# Patient Record
Sex: MALE | Race: WHITE | Employment: OTHER | URBAN - METROPOLITAN AREA
[De-identification: names, ages, dates, MRNs, and addresses within clinical notes are randomized per-mention and may not be internally consistent; named-entity substitution may affect disease eponyms.]

---

## 2017-04-26 ENCOUNTER — OFFICE VISIT (OUTPATIENT)
Dept: NURSING | Facility: CLINIC | Age: 82
End: 2017-04-26
Payer: MEDICARE

## 2017-04-26 DIAGNOSIS — J84.112 IPF (IDIOPATHIC PULMONARY FIBROSIS) (H): ICD-10-CM

## 2017-04-26 PROCEDURE — 94375 RESPIRATORY FLOW VOLUME LOOP: CPT | Performed by: INTERNAL MEDICINE

## 2017-04-26 PROCEDURE — 94729 DIFFUSING CAPACITY: CPT | Performed by: INTERNAL MEDICINE

## 2017-04-26 NOTE — MR AVS SNAPSHOT
After Visit Summary   4/26/2017    Des Saravia    MRN: [id number]           Patient Information     Date Of Birth          1935        Visit Information        Provider Department      4/26/2017 3:30 PM PFT LAB [facility]        Today's Diagnoses     IPF (idiopathic pulmonary fibrosis) (H)           Follow-ups after your visit        Your next 10 appointments already scheduled     Apr 27, 2017  9:00 AM CDT   Return Visit with David Morris Perlman, MD   [facility] ([facility])    [address] [place] [id number] [phone]              Who to contact     If you have questions or need follow up information about today's clinic visit or your schedule please contact [facility] directly at [phone].  Normal or non-critical lab and imaging results will be communicated to you by MyChart, letter or phone within 4 business days after the clinic has received the results. If you do not hear from us within 7 days, please contact the clinic through MyChart or phone. If you have a critical or abnormal lab result, we will notify you by phone as soon as possible.  Submit refill requests through [employer] or call your pharmacy and they will forward the refill request to us. Please allow 3 business days for your refill to be completed.          Additional Information About Your Visit        [email or web address] Information     [employer] is an electronic gateway that provides easy, online access to your medical records. With [employer], you can request a clinic appointment, read your test results, renew a prescription or communicate with your care team.     To sign up for [employer] visit the website at [email or web address]   You will be asked to enter the access code listed below, as well as some personal information. Please follow the directions to create your username and password.     Your access code is: [email or web address]  Expires:  2017  4:23 PM     Your access code will  in 90 days. If you need help or a new code, please contact your [facility] or call [phone] for assistance.        Care EveryWhere ID     This is your Care EveryWhere ID. This could be used by other organizations to access your [place] medical records  [id number]         Blood Pressure from Last 3 Encounters:   16 125/74   16 134/75   16 123/74    Weight from Last 3 Encounters:   16 79.7 kg (175 lb 9.6 oz)   16 81.9 kg (180 lb 8 oz)   16 82.7 kg (182 lb 6.4 oz)              We Performed the Following     General PFT Lab (Please always keep checked)     HC DIFFUSING CAPACITY     HC PLETHYSMOGRAPHY LUNG VOLUMES W/WO AIRWAY RESIST     Pulmonary Function Test     RESPIRATORY FLOW VOLUME LOOP     RESPIRATORY FLOW VOLUME LOOP        Primary Care Provider Office Phone # Fax #    Omkar Mccarthy [phone]       [address] [address]        Thank you!     Thank you for choosing [facility]  for your care. Our goal is always to provide you with excellent care. Hearing back from our patients is one way we can continue to improve our services. Please take a few minutes to complete the written survey that you may receive in the mail after your visit with us. Thank you!             Your Updated Medication List - Protect others around you: Learn how to safely use, store and throw away your medicines at www.disposemymeds.org.          This list is accurate as of: 17  4:23 PM.  Always use your most recent med list.                   Brand Name Dispense Instructions for use    APAP 325 MG Tabs      Take 975 mg by mouth every 4 hours as needed for mild pain       aspirin 81 MG tablet      Take 1 tablet by mouth daily.       guaiFENesin-codeine 100-10 MG/5ML Soln solution    ROBITUSSIN AC     Take 1-2 tsp. by mouth every 4 hours as needed for cough        LIPITOR 40 MG tablet   Generic drug:  atorvastatin      Take 1 tablet by mouth daily.       metoprolol 25 MG 24 hr tablet    TOPROL-XL         Multiple vitamin Tabs      Take 1 tablet by mouth daily.       PANTOPRAZOLE SODIUM PO      Take 40 mg by mouth 2 times daily (before meals)       tamsulosin 0.4 MG capsule    FLOMAX    30 capsule    Take 1 capsule (0.4 mg) by mouth daily       ZITHROMAX Z-TREVON PO

## 2017-04-27 ENCOUNTER — OFFICE VISIT (OUTPATIENT)
Dept: PULMONOLOGY | Facility: CLINIC | Age: 82
End: 2017-04-27
Payer: MEDICARE

## 2017-04-27 VITALS
WEIGHT: 174.2 LBS | HEART RATE: 85 BPM | BODY MASS INDEX: 24.39 KG/M2 | DIASTOLIC BLOOD PRESSURE: 81 MMHG | OXYGEN SATURATION: 96 % | SYSTOLIC BLOOD PRESSURE: 140 MMHG | HEIGHT: 71 IN

## 2017-04-27 DIAGNOSIS — J84.112 IPF (IDIOPATHIC PULMONARY FIBROSIS) (H): Primary | ICD-10-CM

## 2017-04-27 PROCEDURE — 99214 OFFICE O/P EST MOD 30 MIN: CPT | Performed by: INTERNAL MEDICINE

## 2017-04-27 NOTE — PROGRESS NOTES
"Reason for Visit  Des Saravia is a 82 year old year old male who is being seen for RECHECK    ILD HPI    See Dictated Note      Current Outpatient Prescriptions   Medication     metoprolol (TOPROL-XL) 25 MG 24 hr tablet     tamsulosin (FLOMAX) 0.4 MG 24 hr capsule     acetaminophen 325 MG TABS     PANTOPRAZOLE SODIUM PO     aspirin 81 MG tablet     atorvastatin (LIPITOR) 40 MG tablet     Multiple Vitamin TABS     No current facility-administered medications for this visit.      Allergies   Allergen Reactions     Lisinopril Cough     Past Medical History:   Diagnosis Date     CAD (coronary artery disease)     CABG in 1998     GERD (gastroesophageal reflux disease)      HTN (hypertension)      Hyperlipidaemia      IPF (idiopathic pulmonary fibrosis) (H)     diagnosed in 2008 by VATS lung biopsy; completed [id number] study in 2011 - received drug.  In IPF biomarkers study since 2013.  Pirfenidone started 1/2015, stopped 4/2015 due to rash.       Past Surgical History:   Procedure Laterality Date     CHOLECYSTECTOMY  2/2014     CORONARY ARTERY BYPASS  1998    4 vessel     THORACOSCOPIC BIOPSY LUNG  2009       Social History     Social History     Marital status:      Spouse name: N/A     Number of children: N/A     Years of education: N/A     Occupational History     Not on file.     Social History Main Topics     Smoking status: Never Smoker     Smokeless tobacco: Never Used     Alcohol use No     Drug use: Not on file     Sexual activity: Not on file     Other Topics Concern     Not on file     Social History Narrative       No family history on file.    ROS Pulmonary    A complete ROS was otherwise negative except as noted in the HPI.  Vitals:    04/27/17 0901 04/27/17 0902   BP: 140/81    BP Location: Left arm    Patient Position: Chair    Cuff Size: Adult Regular    Pulse: 85    SpO2: (!) 88% 96%   Weight: 79 kg (174 lb 3.2 oz)    Height: 1.803 m (5' 11\")      Exam:   GENERAL APPEARANCE: Well developed, " well nourished, alert, and in no apparent distress.  NECK: supple, no masses, no thyromegaly.  LYMPHATICS: No significant axillary, cervical, or supraclavicular nodes.  RESP: Bibasilar crackles  CV: Normal S1, S2, regular rhythm, normal rate, no rub, no murmur,  no gallop, no LE edema.   ABDOMEN:  Bowel sounds normal, soft, nontender, no HSM or masses.   MS: extremities normal- no clubbing, no cyanosis.  SKIN: no rash on limited exam  PSYCH: mentation appears normal. and affect normal/bright  Results: I have reviewed all imaging, PFTs and other relavent tests, please see below for details, PFT and imaging results were reviewed with the patient.  See Dictated Note  Assessment and plan:  See Dictated Note    CBC   Recent Labs   Lab Test  01/17/16   0011  01/14/16   0526   RBC  4.67  4.67   HGB  14.2  14.3   HCT  42.0  42.4   PLT  247  219       Basic Metabolic Panel  Recent Labs   Lab Test  01/16/16   1111  01/15/16   0635  01/14/16   0526   NA   --   140  140   POTASSIUM  4.1  3.7  3.4   CHLORIDE   --   104  104   CO2   --   27  28   BUN   --   18  21   GLC   --   99  102*   TEE   --   8.3*  8.2*       INR  Recent Labs   Lab Test  01/17/16   0011  11/01/15   0743   INR  1.19*  1.14       PFT  PFT Latest Ref Rng & Units 4/26/2017   FVC L 1.97   FEV1 L 1.64   FVC% % 50   FEV1% % 57           CC:

## 2017-04-27 NOTE — PROGRESS NOTES
HISTORY OF PRESENT ILLNESS:  Mr. Des Saravia is an 82-year-old male with interstitial lung disease, likely secondary to idiopathic pulmonary fibrosis, although he has had the diagnosis for approximately 10 years.  In general the patient has done well.  He has been stable for quite some time; however, in 08/2016 he did have a drop in his PFTs.  We discussed antifibrotic therapy, he was initiated on pirfenidone; however, did not tolerate this due to rash.  He stopped this and otherwise has done okay.  He continues to use his oxygen at home, typically 3-4 liters with exertion.  2 at rest.  He uses at night.  In talking with the patient and his wife he does feel that maybe his exercise tolerance has decreased a little bit.  She feels that he is more short of breath than he has been.  In general though the patient states that he feels pretty good.  He is able to do all of the activities that he wants to do, he has been able to travel and generally states that overall he feels well.  He has no other new complaints today.      PULMONARY FUNCTION TESTS:  Moderate restriction with severe reduction in diffusing capacity.  The FVC is stable from previous, although still down from earlier in 2016.  The diffusing capacity is down from previous; however, generally in the range where it has been over the past few years.      ASSESSMENT AND PLAN:  An 82-year-old male with interstitial lung disease, likely idiopathic pulmonary fibrosis.  I did have a discussion with the patient about antifibrotic therapy again this time with the patient to see if he was interested.  We discussed the pros and cons and basically that this may help prevent further progression of his disease or slow further progression of his disease, but does possibly have some side effects.  After a long discussion with the patient and his wife he has elected not to initiate at this time, he feels that he generally feels good and is worried about taking a medication  that is very expensive and also that would give him more side effects and I think ultimately this is a reasonable decision for him.  Therefore, we will just continue his supplemental oxygen.  I will continue to see him every 4 months with pulmonary function tests.  I did inform him that if he decides to change his mind and wants to initiate antifibrotic therapy we can initiate that at any time.  I will see the patient back in 4 months with pulmonary function tests.  He will call sooner as needed.         DAVID M. PERLMAN, MD             D: 2017 10:21   T: 2017 14:06   MT: LUCILA#[id number]      Name:     ERIN YOO   MRN:      [id number]        Account:      [id number]   :      1935           Visit Date:   2017      Document: [id number]

## 2017-04-27 NOTE — NURSING NOTE
"Des Saravia's goals for this visit include: IPD  He requests these members of his care team be copied on today's visit information: yes    PCP: Omkar Mccarthy    Referring Provider:  No referring provider defined for this encounter.    Chief Complaint   Patient presents with     RECHECK       Initial /81 (BP Location: Left arm, Patient Position: Chair, Cuff Size: Adult Regular)  Pulse 85  Ht 1.803 m (5' 11\")  Wt 79 kg (174 lb 3.2 oz)  SpO2 96%  BMI 24.3 kg/m2 Estimated body mass index is 24.3 kg/(m^2) as calculated from the following:    Height as of this encounter: 1.803 m (5' 11\").    Weight as of this encounter: 79 kg (174 lb 3.2 oz).  Medication Reconciliation: complete    Do you need any medication refills at today's visit? no    "

## 2017-04-27 NOTE — MR AVS SNAPSHOT
After Visit Summary   4/27/2017    Des Saravia    MRN: [id number]           Patient Information     Date Of Birth          1935        Visit Information        Provider Department      4/27/2017 9:00 AM Perlman, David Morris, MD [facility]        Today's Diagnoses     IPF (idiopathic pulmonary fibrosis) (H)    -  1       Follow-ups after your visit        Your next 10 appointments already scheduled     Sep 28, 2017  9:00 AM CDT   Office Visit with PFT LAB   [facility] ([facility])    [address] [place] [id number] [phone]           Bring a current list of meds and any records pertaining to this visit.  For Physicals, please bring immunization records and any forms needing to be filled out.  Please arrive 10 minutes early to complete paperwork.            Sep 28, 2017 10:30 AM CDT   Return Visit with David Morris Perlman, MD   [facility] ([facility])    [address] [place] [id number] [phone]              Future tests that were ordered for you today     Open Future Orders        Priority Expected Expires Ordered    General PFT Lab (Please always keep checked) Routine  4/27/2018 4/27/2017    Pulmonary Function Test Routine  4/27/2018 4/27/2017    6 minute walk test Routine  4/27/2018 4/27/2017            Who to contact     If you have questions or need follow up information about today's clinic visit or your schedule please contact [facility] directly at [phone].  Normal or non-critical lab and imaging results will be communicated to you by MyChart, letter or phone within 4 business days after the clinic has received the results. If you do not hear from us within 7 days, please contact the clinic through MyChart or phone. If you have a critical or abnormal lab result, we will notify you by phone as soon as possible.  Submit refill requests  "through [employer] or call your pharmacy and they will forward the refill request to us. Please allow 3 business days for your refill to be completed.          Additional Information About Your Visit        Care EveryWhere ID     This is your Care EveryWhere ID. This could be used by other organizations to access your [place] medical records  [id number]        Your Vitals Were     Pulse Height Pulse Oximetry BMI (Body Mass Index)          85 1.803 m (5' 11\") 96% 24.3 kg/m2         Blood Pressure from Last 3 Encounters:   04/27/17 140/81   11/03/16 125/74   08/04/16 134/75    Weight from Last 3 Encounters:   04/27/17 79 kg (174 lb 3.2 oz)   11/03/16 79.7 kg (175 lb 9.6 oz)   08/04/16 81.9 kg (180 lb 8 oz)               Primary Care Provider Office Phone # Fax #    Omkar Mccarthy [phone]       [address] [address]        Thank you!     Thank you for choosing [facility]  for your care. Our goal is always to provide you with excellent care. Hearing back from our patients is one way we can continue to improve our services. Please take a few minutes to complete the written survey that you may receive in the mail after your visit with us. Thank you!             Your Updated Medication List - Protect others around you: Learn how to safely use, store and throw away your medicines at www.disposemymeds.org.          This list is accurate as of: 4/27/17  9:47 AM.  Always use your most recent med list.                   Brand Name Dispense Instructions for use    APAP 325 MG Tabs      Take 975 mg by mouth every 4 hours as needed for mild pain       aspirin 81 MG tablet      Take 1 tablet by mouth daily.       LIPITOR 40 MG tablet   Generic drug:  atorvastatin      Take 1 tablet by mouth daily.       metoprolol 25 MG 24 hr tablet    TOPROL-XL         Multiple vitamin Tabs      Take 1 tablet by mouth daily.       PANTOPRAZOLE SODIUM PO      Take 40 mg by mouth 2 times " daily (before meals)       tamsulosin 0.4 MG capsule    FLOMAX    30 capsule    Take 1 capsule (0.4 mg) by mouth daily

## 2017-06-22 LAB
DLCOUNC-%PRED-PRE: 37 %
DLCOUNC-PRE: 8.94 ML/MIN/MMHG
DLCOUNC-PRED: 23.57 ML/MIN/MMHG
ERV-%PRED-PRE: 55 %
ERV-PRE: 0.6 L
ERV-PRED: 1.08 L
EXPTIME-PRE: 5.4 SEC
FEF2575-%PRED-PRE: 85 %
FEF2575-PRE: 1.7 L/SEC
FEF2575-PRED: 1.98 L/SEC
FEFMAX-%PRED-PRE: 107 %
FEFMAX-PRE: 7.6 L/SEC
FEFMAX-PRED: 7.07 L/SEC
FEV1-%PRED-PRE: 57 %
FEV1-PRE: 1.64 L
FEV1FEV6-PRE: 83 %
FEV1FEV6-PRED: 76 %
FEV1FVC-PRE: 83 %
FEV1FVC-PRED: 74 %
FEV1SVC-PRE: 86 %
FEV1SVC-PRED: 63 %
FIFMAX-PRE: 4.73 L/SEC
FVC-%PRED-PRE: 50 %
FVC-PRE: 1.97 L
FVC-PRED: 3.89 L
IC-%PRED-PRE: 37 %
IC-PRE: 1.3 L
IC-PRED: 3.49 L
VA-%PRED-PRE: 43 %
VA-PRE: 3.01 L
VC-%PRED-PRE: 41 %
VC-PRE: 1.9 L
VC-PRED: 4.57 L

## 2017-09-07 ENCOUNTER — OFFICE VISIT (OUTPATIENT)
Dept: NURSING | Facility: CLINIC | Age: 82
End: 2017-09-07
Payer: MEDICARE

## 2017-09-07 ENCOUNTER — OFFICE VISIT (OUTPATIENT)
Dept: PULMONOLOGY | Facility: CLINIC | Age: 82
End: 2017-09-07
Payer: MEDICARE

## 2017-09-07 VITALS
SYSTOLIC BLOOD PRESSURE: 132 MMHG | BODY MASS INDEX: 23.99 KG/M2 | DIASTOLIC BLOOD PRESSURE: 78 MMHG | OXYGEN SATURATION: 92 % | WEIGHT: 177.1 LBS | HEART RATE: 85 BPM | HEIGHT: 72 IN

## 2017-09-07 DIAGNOSIS — J84.9 ILD (INTERSTITIAL LUNG DISEASE) (H): ICD-10-CM

## 2017-09-07 DIAGNOSIS — J84.112 IPF (IDIOPATHIC PULMONARY FIBROSIS) (H): ICD-10-CM

## 2017-09-07 DIAGNOSIS — J84.9 ILD (INTERSTITIAL LUNG DISEASE) (H): Primary | ICD-10-CM

## 2017-09-07 LAB
6 MIN WALK (FT): NORMAL FT
6 MIN WALK (FT): NORMAL FT
6 MIN WALK (M): NORMAL M
6 MIN WALK (M): NORMAL M

## 2017-09-07 PROCEDURE — 94375 RESPIRATORY FLOW VOLUME LOOP: CPT | Performed by: INTERNAL MEDICINE

## 2017-09-07 PROCEDURE — 99213 OFFICE O/P EST LOW 20 MIN: CPT | Mod: 25 | Performed by: INTERNAL MEDICINE

## 2017-09-07 PROCEDURE — 94726 PLETHYSMOGRAPHY LUNG VOLUMES: CPT | Performed by: INTERNAL MEDICINE

## 2017-09-07 PROCEDURE — 94620 HC PULMONARY STRESS TEST, SIMPLE: CPT | Performed by: INTERNAL MEDICINE

## 2017-09-07 PROCEDURE — 94729 DIFFUSING CAPACITY: CPT | Performed by: INTERNAL MEDICINE

## 2017-09-07 NOTE — MR AVS SNAPSHOT
After Visit Summary   9/7/2017    Des Saravia    MRN: [id number]           Patient Information     Date Of Birth          1935        Visit Information        Provider Department      9/7/2017 8:45 AM PFT LAB [facility]        Today's Diagnoses     IPF (idiopathic pulmonary fibrosis) (H)        ILD (interstitial lung disease) (H)           Follow-ups after your visit        Your next 10 appointments already scheduled     Sep 28, 2017  9:00 AM CDT   Office Visit with PFT LAB   [facility]    [id number] [place] [id number] [phone]           Bring a current list of meds and any records pertaining to this visit. For Physicals, please bring immunization records and any forms needing to be filled out. Please arrive 10 minutes early to complete paperwork.            Jan 04, 2018  9:00 AM CST   SHORT with PFT LAB   [facility]    [id number] [place] [phone]            Jan 04, 2018  9:30 AM CST   Return Visit with David Morris Perlman, MD   [facility]    [address] [place] [phone]              Future tests that were ordered for you today     Open Future Orders        Priority Expected Expires Ordered    6 minute walk test Routine 9/7/2017 9/7/2018 9/7/2017    General PFT Lab (Please always keep checked) Routine  9/7/2018 9/7/2017    Pulmonary Function Test Routine  9/7/2018 9/7/2017            Who to contact     If you have questions or need follow up information about today's clinic visit or your schedule please contact [facility] directly at [phone].  Normal or non-critical lab and imaging results will be communicated to you by MyChart, letter or phone within 4 business days after the clinic has received the results. If you do  not hear from us within 7 days, please contact the clinic through [facility] or phone. If you have a critical or abnormal lab result, we will notify you by phone as soon as possible.  Submit refill requests through [facility] or call your pharmacy and they will forward the refill request to us. Please allow 3 business days for your refill to be completed.          Additional Information About Your Visit        [facility] Information     [facility] is an electronic gateway that provides easy, online access to your medical records. With [facility], you can request a clinic appointment, read your test results, renew a prescription or communicate with your care team.     To sign up for [facility] visit the website at [email or web address]   You will be asked to enter the access code listed below, as well as some personal information. Please follow the directions to create your username and password.     Your access code is: [email or web address]  Expires: 2017 10:55 AM     Your access code will  in 90 days. If you need help or a new code, please contact your [facility] or call [phone] for assistance.        Care EveryWhere ID     This is your Care EveryWhere ID. This could be used by other organizations to access your [place] medical records  [id number]         Blood Pressure from Last 3 Encounters:   17 132/78   17 140/81   16 125/74    Weight from Last 3 Encounters:   17 80.3 kg (177 lb 1.6 oz)   17 79 kg (174 lb 3.2 oz)   16 79.7 kg (175 lb 9.6 oz)              We Performed the Following     6 minute walk test     6 minute walk test     General PFT Lab (Please always keep checked)     HC DIFFUSING CAPACITY     Pulmonary Function Test        Primary Care Provider Office Phone # Fax #    Omkar Mccarthy [phone]       [address] [address]        Equal Access to Services     NANCIE ARRIETA AH: Keke cheek  Dom, waottoda luqadaha, qaybta kaalmada santana, kirk strong chelsiebhavani hendersonkulwant yevgeniy. So [facility] [phone].    ATENCIÓN: Si navjot singletary, tiene a painter disposición servicios gratuitos de asistencia lingüística. Gokul al [phone].    We comply with applicable federal civil rights laws and [place] laws. We do not discriminate on the basis of race, color, national origin, age, disability sex, sexual orientation or gender identity.            Thank you!     Thank you for choosing [facility]  for your care. Our goal is always to provide you with excellent care. Hearing back from our patients is one way we can continue to improve our services. Please take a few minutes to complete the written survey that you may receive in the mail after your visit with us. Thank you!             Your Updated Medication List - Protect others around you: Learn how to safely use, store and throw away your medicines at www.disposemymeds.org.          This list is accurate as of: 9/7/17 11:07 AM.  Always use your most recent med list.                   Brand Name Dispense Instructions for use Diagnosis    APAP 325 MG Tabs      Take 975 mg by mouth every 4 hours as needed for mild pain    Acute post-operative pain       aspirin 81 MG tablet      Take 1 tablet by mouth daily.    IPF (idiopathic pulmonary fibrosis) (H)       LIPITOR 40 MG tablet   Generic drug:  atorvastatin      Take 1 tablet by mouth daily.    IPF (idiopathic pulmonary fibrosis) (H)       metoprolol 25 MG 24 hr tablet    TOPROL-XL          Multiple vitamin Tabs      Take 1 tablet by mouth daily.    IPF (idiopathic pulmonary fibrosis) (H)       PANTOPRAZOLE SODIUM PO      Take 40 mg by mouth 2 times daily (before meals)    IPF (idiopathic pulmonary fibrosis) (H)       tamsulosin 0.4 MG capsule    FLOMAX    30 capsule    Take 1 capsule (0.4 mg) by mouth daily    Urinary retention

## 2017-09-07 NOTE — PROGRESS NOTES
PFT Note:  Completed FVC, SVC, DLCO and Pleth per Dr. Perlman.    6 MWT:  Completed walk on RA for qualification of O2, 4 LPM Pulse Dose and 4 LPM continuous. Reports sent to STAT scan and copies to Dr. Perlman.

## 2017-09-07 NOTE — PROGRESS NOTES
Reason for Visit  Des Saravia is a 82 year old year old male who is being seen for Interstitial Lung Disease (ILD)    ILD HPI    Des Saravia is an 82-year-old male with a history of interstitial lung disease likely secondary to peptic pulmonary fibrosis although he's had the diagnosis for about 10 years. In 2016 he had issues with recurrent right-sided pneumothorax ultimately requiring talc pleurodesis. He is seen today for follow-up overall states that generally he feels well he does feel that maybe he is a little more short of breath with exertion than he was at the last visit. We had discussed and a fibrotic therapy in the past he did briefly try pirfenidone although he could not tolerate this. He's been reluctant to try any other anti-fibrotic medication since then. Overall he generally states though that he feels well is able to do his activities of daily living and travel by car throughout the region to see his grandchildren. Use oxygen to 3 L with exertion. No other new complaints today.      Current Outpatient Prescriptions   Medication     metoprolol (TOPROL-XL) 25 MG 24 hr tablet     tamsulosin (FLOMAX) 0.4 MG 24 hr capsule     acetaminophen 325 MG TABS     PANTOPRAZOLE SODIUM PO     aspirin 81 MG tablet     atorvastatin (LIPITOR) 40 MG tablet     Multiple Vitamin TABS     No current facility-administered medications for this visit.      Allergies   Allergen Reactions     Lisinopril Cough     Past Medical History:   Diagnosis Date     CAD (coronary artery disease)     CABG in 1998     GERD (gastroesophageal reflux disease)      HTN (hypertension)      Hyperlipidaemia      IPF (idiopathic pulmonary fibrosis) (H)     diagnosed in 2008 by VATS lung biopsy; completed [id number] study in 2011 - received drug.  In IPF biomarkers study since 2013.  Pirfenidone started 1/2015, stopped 4/2015 due to rash.       Past Surgical History:   Procedure Laterality Date     CHOLECYSTECTOMY  2/2014     CORONARY ARTERY  "BYPASS  1998    4 vessel     THORACOSCOPIC BIOPSY LUNG  2009       Social History     Social History     Marital status:      Spouse name: N/A     Number of children: N/A     Years of education: N/A     Occupational History     Not on file.     Social History Main Topics     Smoking status: Never Smoker     Smokeless tobacco: Never Used     Alcohol use No     Drug use: Not on file     Sexual activity: Not on file     Other Topics Concern     Not on file     Social History Narrative       No family history on file.    ROS Pulmonary    A complete ROS was otherwise negative except as noted in the HPI.  Vitals:    09/07/17 0956   BP: 132/78   BP Location: Left arm   Patient Position: Chair   Cuff Size: Adult Regular   Pulse: 85   SpO2: 92%   Weight: 80.3 kg (177 lb 1.6 oz)   Height: 1.816 m (5' 11.5\")     Exam:   GENERAL APPEARANCE: Well developed, well nourished, alert, and in no apparent distress.  NECK: supple, no masses, no thyromegaly.  LYMPHATICS: No significant axillary, cervical, or supraclavicular nodes.  RESP: good air flow throughout, - no crackles, rhonchi or wheezes.  CV: Normal S1, S2, regular rhythm, normal rate, no rub, no murmur,  no gallop, no LE edema.   ABDOMEN:  Bowel sounds normal, soft, nontender, no HSM or masses.   MS: extremities normal- no clubbing, no cyanosis.  SKIN: no rash on limited exam  NEURO: Mentation intact, speech normal, normal strength and tone, normal gait and stance  PSYCH: mentation appears normal. and affect normal/bright  Results: I have reviewed all imaging, PFTs and other relavent tests, please see below for details, PFT and imaging results were reviewed with the patient.  PFTs: Moderate restriction with severe reduction in DLCO, stable from previous.  Assessment and plan:     82-year-old male with idiopathic pulmonary fibrosis stable point function tests but perhaps some slight progression of disease based on his symptoms. Again discussed anti-fibrotic therapy and " the patient generally feels well and is not interested in starting a new medication that may give him significant side effects. I think is reasonable decision for him. We'll continue with the supplemental oxygen I will plan to see him back in 4 months with Pulmicort and as he will call sooner with any changes in his breathing.    CBC   Recent Labs   Lab Test  01/17/16   0011  01/14/16   0526   RBC  4.67  4.67   HGB  14.2  14.3   HCT  42.0  42.4   PLT  247  219       Basic Metabolic Panel  Recent Labs   Lab Test  01/16/16   1111  01/15/16   0635  01/14/16   0526   NA   --   140  140   POTASSIUM  4.1  3.7  3.4   CHLORIDE   --   104  104   CO2   --   27  28   BUN   --   18  21   GLC   --   99  102*   TEE   --   8.3*  8.2*       INR  Recent Labs   Lab Test  01/17/16   0011  11/01/15   0743   INR  1.19*  1.14       PFT  PFT Latest Ref Rng & Units 9/7/2017   FVC L 1.99   FEV1 L 1.63   FVC% % 51   FEV1% % 57           CC:

## 2017-09-07 NOTE — MR AVS SNAPSHOT
After Visit Summary   9/7/2017    Des Saravia    MRN: [id number]           Patient Information     Date Of Birth          1935        Visit Information        Provider Department      9/7/2017 10:30 AM Perlman, David Morris, MD [facility]        Today's Diagnoses     ILD (interstitial lung disease) (H)    -  1       Follow-ups after your visit        Your next 10 appointments already scheduled     Sep 28, 2017  9:00 AM CDT   Office Visit with PFT LAB   [facility])    [address] [place] [id number] [phone]           Bring a current list of meds and any records pertaining to this visit. For Physicals, please bring immunization records and any forms needing to be filled out. Please arrive 10 minutes early to complete paperwork.            Jan 04, 2018  9:00 AM CST   SHORT with PFT LAB   [facility])    [address] [place] [id number] [phone]            Jan 04, 2018  9:30 AM CST   Return Visit with David Morris Perlman, MD   [facility])    [address] [place] [id number] [phone]              Future tests that were ordered for you today     Open Future Orders        Priority Expected Expires Ordered    General PFT Lab (Please always keep checked) Routine  9/7/2018 9/7/2017    Pulmonary Function Test Routine  9/7/2018 9/7/2017    6 minute walk test Routine  9/7/2018 9/7/2017            Who to contact     If you have questions or need follow up information about today's clinic visit or your schedule please contact [facility] directly at [phone].  Normal or non-critical lab and imaging results will be communicated to you by MyChart, letter or phone within 4 business days after the clinic has received the results. If you do not hear from us within 7 days,  "please contact the clinic through [employer] or phone. If you have a critical or abnormal lab result, we will notify you by phone as soon as possible.  Submit refill requests through [employer] or call your pharmacy and they will forward the refill request to us. Please allow 3 business days for your refill to be completed.          Additional Information About Your Visit        [employer] Information     [employer] is an electronic gateway that provides easy, online access to your medical records. With [employer], you can request a clinic appointment, read your test results, renew a prescription or communicate with your care team.     To sign up for [employer] visit the website at [email or web address]   You will be asked to enter the access code listed below, as well as some personal information. Please follow the directions to create your username and password.     Your access code is: [email or web address]  Expires: 2017 10:55 AM     Your access code will  in 90 days. If you need help or a new code, please contact your [facility] or call [phone] for assistance.        Care EveryWhere ID     This is your Care EveryWhere ID. This could be used by other organizations to access your [facility] medical records  [id number]        Your Vitals Were     Pulse Height Pulse Oximetry BMI (Body Mass Index)          85 1.816 m (5' 11.5\") 92% 24.36 kg/m2         Blood Pressure from Last 3 Encounters:   17 132/78   17 140/81   16 125/74    Weight from Last 3 Encounters:   17 80.3 kg (177 lb 1.6 oz)   17 79 kg (174 lb 3.2 oz)   16 79.7 kg (175 lb 9.6 oz)              We Performed the Following     DNR/DNI        Primary Care Provider Office Phone # Fax #    Omkar Mccarthy [phone]       [address] [address]        Equal Access to Services     NANCIE ARRIETA AH: Hadii sage Fernandes, waottoda alma delia, qaybta renee " kirk dillonmarc chau'aan ah. Eunice [facility] [phone].    ATENCIÓN: Si habla hayder, tiene a painter disposición servicios gratuitos de asistencia lingüística. Gokul al [phone].    We comply with applicable federal civil rights laws and [place] laws. We do not discriminate on the basis of race, color, national origin, age, disability sex, sexual orientation or gender identity.            Thank you!     Thank you for choosing [facility]  for your care. Our goal is always to provide you with excellent care. Hearing back from our patients is one way we can continue to improve our services. Please take a few minutes to complete the written survey that you may receive in the mail after your visit with us. Thank you!             Your Updated Medication List - Protect others around you: Learn how to safely use, store and throw away your medicines at www.disposemymeds.org.          This list is accurate as of: 9/7/17 10:55 AM.  Always use your most recent med list.                   Brand Name Dispense Instructions for use Diagnosis    APAP 325 MG Tabs      Take 975 mg by mouth every 4 hours as needed for mild pain    Acute post-operative pain       aspirin 81 MG tablet      Take 1 tablet by mouth daily.    IPF (idiopathic pulmonary fibrosis) (H)       LIPITOR 40 MG tablet   Generic drug:  atorvastatin      Take 1 tablet by mouth daily.    IPF (idiopathic pulmonary fibrosis) (H)       metoprolol 25 MG 24 hr tablet    TOPROL-XL          Multiple vitamin Tabs      Take 1 tablet by mouth daily.    IPF (idiopathic pulmonary fibrosis) (H)       PANTOPRAZOLE SODIUM PO      Take 40 mg by mouth 2 times daily (before meals)    IPF (idiopathic pulmonary fibrosis) (H)       tamsulosin 0.4 MG capsule    FLOMAX    30 capsule    Take 1 capsule (0.4 mg) by mouth daily    Urinary retention

## 2017-09-07 NOTE — NURSING NOTE
"Des Saravia's goals for this visit include: ILD  He requests these members of his care team be copied on today's visit information: yes    PCP: Omkar Mccarthy    Referring Provider:  ESTABLISHED PATIENT  No address on file    Chief Complaint   Patient presents with     Interstitial Lung Disease (ILD)       Initial /78 (BP Location: Left arm, Patient Position: Chair, Cuff Size: Adult Regular)  Pulse 85  Ht 1.816 m (5' 11.5\")  Wt 80.3 kg (177 lb 1.6 oz)  SpO2 92%  BMI 24.36 kg/m2 Estimated body mass index is 24.36 kg/(m^2) as calculated from the following:    Height as of this encounter: 1.816 m (5' 11.5\").    Weight as of this encounter: 80.3 kg (177 lb 1.6 oz).  Medication Reconciliation: complete    Do you need any medication refills at today's visit? no    "

## 2017-09-09 LAB
DLCOUNC-%PRED-PRE: 39 %
DLCOUNC-PRE: 9.23 ML/MIN/MMHG
DLCOUNC-PRED: 23.49 ML/MIN/MMHG
ERV-%PRED-PRE: 73 %
ERV-PRE: 0.77 L
ERV-PRED: 1.05 L
EXPTIME-PRE: 6.09 SEC
FEF2575-%PRED-PRE: 93 %
FEF2575-PRE: 1.85 L/SEC
FEF2575-PRED: 1.97 L/SEC
FEFMAX-%PRED-PRE: 103 %
FEFMAX-PRE: 7.26 L/SEC
FEFMAX-PRED: 7.03 L/SEC
FEV1-%PRED-PRE: 57 %
FEV1-PRE: 1.63 L
FEV1FEV6-PRE: 83 %
FEV1FEV6-PRED: 76 %
FEV1FVC-PRE: 82 %
FEV1FVC-PRED: 74 %
FEV1SVC-PRE: 83 %
FEV1SVC-PRED: 63 %
FIFMAX-PRE: 4.37 L/SEC
FRCPLETH-%PRED-PRE: 65 %
FRCPLETH-PRE: 2.51 L
FRCPLETH-PRED: 3.84 L
FVC-%PRED-PRE: 51 %
FVC-PRE: 1.99 L
FVC-PRED: 3.88 L
IC-%PRED-PRE: 34 %
IC-PRE: 1.2 L
IC-PRED: 3.51 L
RVPLETH-%PRED-PRE: 59 %
RVPLETH-PRE: 1.74 L
RVPLETH-PRED: 2.93 L
TLCPLETH-%PRED-PRE: 51 %
TLCPLETH-PRE: 3.71 L
TLCPLETH-PRED: 7.23 L
VA-%PRED-PRE: 43 %
VA-PRE: 3.02 L
VC-%PRED-PRE: 43 %
VC-PRE: 1.98 L
VC-PRED: 4.56 L

## 2017-11-10 DIAGNOSIS — J84.9 ILD (INTERSTITIAL LUNG DISEASE) (H): Primary | ICD-10-CM

## 2018-01-25 ENCOUNTER — OFFICE VISIT (OUTPATIENT)
Dept: PULMONOLOGY | Facility: CLINIC | Age: 83
End: 2018-01-25
Payer: MEDICARE

## 2018-01-25 ENCOUNTER — OFFICE VISIT (OUTPATIENT)
Dept: NURSING | Facility: CLINIC | Age: 83
End: 2018-01-25
Payer: MEDICARE

## 2018-01-25 VITALS
DIASTOLIC BLOOD PRESSURE: 75 MMHG | WEIGHT: 177 LBS | HEART RATE: 80 BPM | TEMPERATURE: 96.7 F | OXYGEN SATURATION: 97 % | BODY MASS INDEX: 24.78 KG/M2 | SYSTOLIC BLOOD PRESSURE: 126 MMHG | HEIGHT: 71 IN

## 2018-01-25 DIAGNOSIS — J84.112 IPF (IDIOPATHIC PULMONARY FIBROSIS) (H): ICD-10-CM

## 2018-01-25 DIAGNOSIS — J84.9 ILD (INTERSTITIAL LUNG DISEASE) (H): ICD-10-CM

## 2018-01-25 DIAGNOSIS — J84.112 IPF (IDIOPATHIC PULMONARY FIBROSIS) (H): Primary | ICD-10-CM

## 2018-01-25 LAB
6 MIN WALK (FT): 200 FT
6 MIN WALK (M): 61 M

## 2018-01-25 PROCEDURE — 94375 RESPIRATORY FLOW VOLUME LOOP: CPT | Performed by: INTERNAL MEDICINE

## 2018-01-25 PROCEDURE — 99207 ZZC DROP WITH A PROCEDURE: CPT | Performed by: INTERNAL MEDICINE

## 2018-01-25 PROCEDURE — 99214 OFFICE O/P EST MOD 30 MIN: CPT | Mod: 25 | Performed by: INTERNAL MEDICINE

## 2018-01-25 PROCEDURE — 94729 DIFFUSING CAPACITY: CPT | Performed by: INTERNAL MEDICINE

## 2018-01-25 PROCEDURE — 94618 PULMONARY STRESS TESTING: CPT | Performed by: INTERNAL MEDICINE

## 2018-01-25 NOTE — MR AVS SNAPSHOT
After Visit Summary   1/25/2018    Des Saravia    MRN: [id number]           Patient Information     Date Of Birth          1935        Visit Information        Provider Department      1/25/2018 9:00 AM Perlman, David Morris, MD [facility]        Today's Diagnoses     IPF (idiopathic pulmonary fibrosis) (H)    -  1       Follow-ups after your visit        Follow-up notes from your care team     Return in about 4 months (around 5/25/2018).      Your next 10 appointments already scheduled     Jun 07, 2018 11:45 AM CDT   [facility] Visit with PFT LAB   [facility]    [address] [place] [id number] [phone]           Bring a current list of meds and any records pertaining to this visit. For Physicals, please bring immunization records and any forms needing to be filled out. Please arrive 10 minutes early to complete paperwork.            Jun 07, 2018  1:00 PM CDT   Return Visit with David Morris Perlman, MD   [facility] ([facility])    [address] [place] [id number] [phone]              Future tests that were ordered for you today     Open Future Orders        Priority Expected Expires Ordered    General PFT Lab (Please always keep checked) Routine  1/25/2019 1/25/2018    Pulmonary Function Test Routine  1/25/2019 1/25/2018    6 minute walk test Routine  1/25/2019 1/25/2018            Who to contact     If you have questions or need follow up information about today's clinic visit or your schedule please contact [facility] directly at [phone].  Normal or non-critical lab and imaging results will be communicated to you by MyChart, letter or phone within 4 business days after the clinic has received the results. If you do not hear from us within 7 days, please contact the clinic through MyChart or phone. If you have a critical or  "abnormal lab result, we will notify you by phone as soon as possible.  Submit refill requests through [employer] or call your pharmacy and they will forward the refill request to us. Please allow 3 business days for your refill to be completed.          Additional Information About Your Visit        [employer] Information     [employer] is an electronic gateway that provides easy, online access to your medical records. With [employer], you can request a clinic appointment, read your test results, renew a prescription or communicate with your care team.     To sign up for [employer] visit the website at [email or web address]   You will be asked to enter the access code listed below, as well as some personal information. Please follow the directions to create your username and password.     Your access code is: [email or web address]  Expires: 2018  9:54 AM     Your access code will  in 90 days. If you need help or a new code, please contact your [facility] or call [phone] for assistance.        Care EveryWhere ID     This is your Care EveryWhere ID. This could be used by other organizations to access your [facility] medical records  [id number]        Your Vitals Were     Pulse Temperature Height Pulse Oximetry BMI (Body Mass Index)       80 96.7  F (35.9  C) (Oral) 1.791 m (5' 10.5\") 97% 25.04 kg/m2        Blood Pressure from Last 3 Encounters:   18 126/75   17 132/78   17 140/81    Weight from Last 3 Encounters:   18 80.3 kg (177 lb)   17 80.3 kg (177 lb 1.6 oz)   17 79 kg (174 lb 3.2 oz)               Primary Care Provider Office Phone # Fax #    Omkar Mccarthy [phone]       [address] [address]        Equal Access to Services     NANCIE ARRIETA : Keke Fernandes, bernadette jenkinsqjanna, qamirelata kirk olivas. [facility] [phone].    ATENCIÓN: Si navjot " español, tiene a painter disposición servicios gratuitos de asistencia lingüística. Gokul raymond [phone].    We comply with applicable federal civil rights laws and [place] laws. We do not discriminate on the basis of race, color, national origin, age, disability, sex, sexual orientation, or gender identity.            Thank you!     Thank you for choosing [facility]  for your care. Our goal is always to provide you with excellent care. Hearing back from our patients is one way we can continue to improve our services. Please take a few minutes to complete the written survey that you may receive in the mail after your visit with us. Thank you!             Your Updated Medication List - Protect others around you: Learn how to safely use, store and throw away your medicines at www.disposemymeds.org.          This list is accurate as of 1/25/18  9:54 AM.  Always use your most recent med list.                   Brand Name Dispense Instructions for use Diagnosis    APAP 325 MG Tabs      Take 975 mg by mouth every 4 hours as needed for mild pain    Acute post-operative pain       aspirin 81 MG tablet      Take 1 tablet by mouth daily.    IPF (idiopathic pulmonary fibrosis) (H)       LIPITOR 40 MG tablet   Generic drug:  atorvastatin      Take 1 tablet by mouth daily.    IPF (idiopathic pulmonary fibrosis) (H)       metoprolol succinate 25 MG 24 hr tablet    TOPROL-XL          Multiple vitamin Tabs      Take 1 tablet by mouth daily.    IPF (idiopathic pulmonary fibrosis) (H)       order for DME     1 Device    Please provide patient with second liquid oxygen reservoir.  Patient does not have enough oxygen to get through the weekend.    ILD (interstitial lung disease) (H)       PANTOPRAZOLE SODIUM PO      Take 40 mg by mouth 2 times daily (before meals)    IPF (idiopathic pulmonary fibrosis) (H)       tamsulosin 0.4 MG capsule    FLOMAX    30 capsule    Take 1 capsule (0.4 mg) by mouth daily    Urinary  retention

## 2018-01-25 NOTE — PROGRESS NOTES
PFT Note:  Completed walk on 4 LPM Pulse Dose. Patient did not tolerate well; Dropped to 86% after only 1 minute. . States that he does not want continuous flow O2. Report sent to STAT Scan and copy to Dr. Perlman.   Completed FVC and DLCO per Dr. Perlman.

## 2018-01-25 NOTE — NURSING NOTE
"Des Saravia's goals for this visit include: ILD  He requests these members of his care team be copied on today's visit information: yes    PCP: Omkar Mccarthy    Referring Provider:  No referring provider defined for this encounter.    Chief Complaint   Patient presents with     Interstitial Lung Disease (ILD)       Initial /75 (BP Location: Left arm, Patient Position: Chair, Cuff Size: Adult Regular)  Pulse 80  Temp 96.7  F (35.9  C) (Oral)  Ht 1.791 m (5' 10.5\")  Wt 80.3 kg (177 lb)  SpO2 97%  BMI 25.04 kg/m2 Estimated body mass index is 25.04 kg/(m^2) as calculated from the following:    Height as of this encounter: 1.791 m (5' 10.5\").    Weight as of this encounter: 80.3 kg (177 lb).  Medication Reconciliation: complete    Do you need any medication refills at today's visit? no    "

## 2018-01-25 NOTE — MR AVS SNAPSHOT
After Visit Summary   1/25/2018    Des Saravia    MRN: [id number]           Patient Information     Date Of Birth          1935        Visit Information        Provider Department      1/25/2018 8:30 AM PFT LAB [facility]        Today's Diagnoses     ILD (interstitial lung disease) (H)        IPF (idiopathic pulmonary fibrosis) (H)           Follow-ups after your visit        Your next 10 appointments already scheduled     May 24, 2018 11:30 AM CDT   Office Visit with PFT LAB   [facility] ([facility])    [address] [place] [id number] [phone]           Bring a current list of meds and any records pertaining to this visit. For Physicals, please bring immunization records and any forms needing to be filled out. Please arrive 10 minutes early to complete paperwork.            May 24, 2018  1:00 PM CDT   Return Visit with David Morris Perlman, MD   [facility] ([facility])    [address] [place] [id number] [phone]              Future tests that were ordered for you today     Open Future Orders        Priority Expected Expires Ordered    General PFT Lab (Please always keep checked) Routine  1/25/2019 1/25/2018    Pulmonary Function Test Routine  1/25/2019 1/25/2018    6 minute walk test Routine  1/25/2019 1/25/2018            Who to contact     If you have questions or need follow up information about today's clinic visit or your schedule please contact [facility] directly at [phone].  Normal or non-critical lab and imaging results will be communicated to you by MyChart, letter or phone within 4 business days after the clinic has received the results. If you do not hear from us within 7 days, please contact the clinic through MyChart or phone. If you have a critical or abnormal lab result, we will notify you by phone as soon as  possible.  Submit refill requests through lifecake or call your pharmacy and they will forward the refill request to us. Please allow 3 business days for your refill to be completed.          Additional Information About Your Visit        lifecake Information     lifecake is an electronic gateway that provides easy, online access to your medical records. With lifecake, you can request a clinic appointment, read your test results, renew a prescription or communicate with your care team.     To sign up for lifecake visit the website at [email or web address]   You will be asked to enter the access code listed below, as well as some personal information. Please follow the directions to create your username and password.     Your access code is: [email or web address]  Expires: 2018  9:54 AM     Your access code will  in 90 days. If you need help or a new code, please contact your [facility] or call [phone] for assistance.        Care EveryWhere ID     This is your Care EveryWhere ID. This could be used by other organizations to access your [place] medical records  [id number]         Blood Pressure from Last 3 Encounters:   18 126/75   17 132/78   17 140/81    Weight from Last 3 Encounters:   18 80.3 kg (177 lb)   17 80.3 kg (177 lb 1.6 oz)   17 79 kg (174 lb 3.2 oz)              We Performed the Following     6 minute walk test     General PFT Lab (Please always keep checked)     HC DIFFUSING CAPACITY     Pulmonary Function Test     PULMONARY STRESS TEST        Primary Care Provider Office Phone # Fax #    Omkar Mccarthy [phone]       [address] [address]        Equal Access to Services     SUKUMAR ARRIETA : Hadii sage Fernandes, wajuan manuel flannery, qaybta kirk olivas. So [facility] [phone].    ATENCIÓN: Si habla español, tiene a painter disposición  servicios gratuitos de asistencia lingüística. Gokul raymond [phone].    We comply with applicable federal civil rights laws and [place] laws. We do not discriminate on the basis of race, color, national origin, age, disability, sex, sexual orientation, or gender identity.            Thank you!     Thank you for choosing [facility]  for your care. Our goal is always to provide you with excellent care. Hearing back from our patients is one way we can continue to improve our services. Please take a few minutes to complete the written survey that you may receive in the mail after your visit with us. Thank you!             Your Updated Medication List - Protect others around you: Learn how to safely use, store and throw away your medicines at www.disposemymeds.org.          This list is accurate as of 1/25/18  1:11 PM.  Always use your most recent med list.                   Brand Name Dispense Instructions for use Diagnosis    APAP 325 MG Tabs      Take 975 mg by mouth every 4 hours as needed for mild pain    Acute post-operative pain       aspirin 81 MG tablet      Take 1 tablet by mouth daily.    IPF (idiopathic pulmonary fibrosis) (H)       LIPITOR 40 MG tablet   Generic drug:  atorvastatin      Take 1 tablet by mouth daily.    IPF (idiopathic pulmonary fibrosis) (H)       metoprolol succinate 25 MG 24 hr tablet    TOPROL-XL          Multiple vitamin Tabs      Take 1 tablet by mouth daily.    IPF (idiopathic pulmonary fibrosis) (H)       order for DME     1 Device    Please provide patient with second liquid oxygen reservoir.  Patient does not have enough oxygen to get through the weekend.    ILD (interstitial lung disease) (H)       PANTOPRAZOLE SODIUM PO      Take 40 mg by mouth 2 times daily (before meals)    IPF (idiopathic pulmonary fibrosis) (H)       tamsulosin 0.4 MG capsule    FLOMAX    30 capsule    Take 1 capsule (0.4 mg) by mouth daily    Urinary retention

## 2018-01-25 NOTE — PROGRESS NOTES
"Reason for Visit  Des Saravia is a 82 year old year old male who is being seen for Interstitial Lung Disease (ILD)    ILD HPI    Des Saravia is a 82-year-old male with idiopathic pulmonary fibrosis not currently on any therapy.  He did not tolerate pirfenidone well.  He returns to clinic today overall stating that he feels he is doing relatively well although in talking with the patient and his wife I think there is evidence that he has had some decline in his pulmonary function since the last visit.  She feels he is struggling more to do activities and has become much less active and is mostly a \"couch potato\".  He is currently using oxygen liquid system portable at 4 L pulse dose.  He does this mainly because he feels the continuous flow makes his nostrils a little bit uncomfortable.  He does not have a stationary concentrator at home and essentially uses the liquid oxygen all the time.      Current Outpatient Prescriptions   Medication     order for DME     metoprolol (TOPROL-XL) 25 MG 24 hr tablet     tamsulosin (FLOMAX) 0.4 MG 24 hr capsule     acetaminophen 325 MG TABS     PANTOPRAZOLE SODIUM PO     aspirin 81 MG tablet     atorvastatin (LIPITOR) 40 MG tablet     Multiple Vitamin TABS     No current facility-administered medications for this visit.      Allergies   Allergen Reactions     Lisinopril Cough     Past Medical History:   Diagnosis Date     CAD (coronary artery disease)     CABG in 1998     GERD (gastroesophageal reflux disease)      HTN (hypertension)      Hyperlipidaemia      IPF (idiopathic pulmonary fibrosis) (H)     diagnosed in 2008 by VATS lung biopsy; completed [id number] study in 2011 - received drug.  In IPF biomarkers study since 2013.  Pirfenidone started 1/2015, stopped 4/2015 due to rash.       Past Surgical History:   Procedure Laterality Date     CHOLECYSTECTOMY  2/2014     CORONARY ARTERY BYPASS  1998    4 vessel     THORACOSCOPIC BIOPSY LUNG  2009       Social History " "    Social History     Marital status:      Spouse name: N/A     Number of children: N/A     Years of education: N/A     Occupational History     Not on file.     Social History Main Topics     Smoking status: Never Smoker     Smokeless tobacco: Never Used     Alcohol use No     Drug use: Not on file     Sexual activity: Not on file     Other Topics Concern     Not on file     Social History Narrative       No family history on file.    ROS Pulmonary    A complete ROS was otherwise negative except as noted in the HPI.  Vitals:    01/25/18 0909   BP: 126/75   BP Location: Left arm   Patient Position: Chair   Cuff Size: Adult Regular   Pulse: 80   Temp: 96.7  F (35.9  C)   TempSrc: Oral   SpO2: 97%   Weight: 80.3 kg (177 lb)   Height: 1.791 m (5' 10.5\")     Exam:   GENERAL APPEARANCE: Well developed, well nourished, alert, and in no apparent distress.  NECK: supple, no masses, no thyromegaly.  LYMPHATICS: No significant axillary, cervical, or supraclavicular nodes.  RESP: good air flow throughout, - bibasilar crackles  CV: Normal S1, S2, regular rhythm, normal rate, no rub, no murmur,  no gallop, no LE edema.   ABDOMEN:  Bowel sounds normal, soft, nontender, no HSM or masses.   MS: extremities normal- no clubbing, no cyanosis.  SKIN: no rash on limited exam  PSYCH: mentation appears normal. and affect normal/bright  Results: I have reviewed all imaging, PFTs and other relavent tests, please see below for details, PFT and imaging results were reviewed with the patient.  PFTs: Moderate restriction, severe reduction in DLCO which is down from previous.  Assessment and plan:     82-year-old male with idiopathic pulmonary fibrosis not currently on anti-fibrotic therapy.  I do think there likely has been some progression of his disease since the last visit his DLCO was down and his dyspnea is worse.  However at this point patient feels his quality of life is good and is not interested in trying anti-fibrotic therapy. "  We did discuss oxygen use I think that the patient would likely benefit from continuous flow oxygen in the range of 4-6 L with exertion based on his walk today.  We did discuss that at some point a stationary concentrator in his home would likely be a better option for him.  They understand this but will continue with her current system for now will call with any changes.  Otherwise I will plan to see the patient back in 4 months with pulmonary function tests he can call sooner with any changes in his breathing.    CBC   Recent Labs   Lab Test  01/17/16   0011  01/14/16   0526   RBC  4.67  4.67   HGB  14.2  14.3   HCT  42.0  42.4   PLT  247  219       Basic Metabolic Panel  Recent Labs   Lab Test  01/16/16   1111  01/15/16   0635  01/14/16   0526   NA   --   140  140   POTASSIUM  4.1  3.7  3.4   CHLORIDE   --   104  104   CO2   --   27  28   BUN   --   18  21   GLC   --   99  102*   TEE   --   8.3*  8.2*       INR  Recent Labs   Lab Test  01/17/16   0011  11/01/15   0743   INR  1.19*  1.14       PFT  PFT Latest Ref Rng & Units 1/25/2018   FVC L 1.92   FEV1 L 1.59   FVC% % 49   FEV1% % 56           CC:

## 2018-01-31 LAB
DLCOUNC-%PRED-PRE: 33 %
DLCOUNC-PRE: 7.9 ML/MIN/MMHG
DLCOUNC-PRED: 23.4 ML/MIN/MMHG
ERV-%PRED-PRE: 63 %
ERV-PRE: 0.67 L
ERV-PRED: 1.05 L
EXPTIME-PRE: 5.35 SEC
FEF2575-%PRED-PRE: 89 %
FEF2575-PRE: 1.75 L/SEC
FEF2575-PRED: 1.95 L/SEC
FEFMAX-%PRED-PRE: 102 %
FEFMAX-PRE: 7.14 L/SEC
FEFMAX-PRED: 6.97 L/SEC
FEV1-%PRED-PRE: 56 %
FEV1-PRE: 1.59 L
FEV1FEV6-PRE: 83 %
FEV1FEV6-PRED: 76 %
FEV1FVC-PRE: 83 %
FEV1FVC-PRED: 74 %
FEV1SVC-PRE: 91 %
FEV1SVC-PRED: 62 %
FIFMAX-PRE: 4.4 L/SEC
FVC-%PRED-PRE: 49 %
FVC-PRE: 1.92 L
FVC-PRED: 3.86 L
IC-%PRED-PRE: 31 %
IC-PRE: 1.09 L
IC-PRED: 3.5 L
VA-%PRED-PRE: 43 %
VA-PRE: 3.01 L
VC-%PRED-PRE: 38 %
VC-PRE: 1.76 L
VC-PRED: 4.55 L

## 2018-05-21 ENCOUNTER — OFFICE VISIT (OUTPATIENT)
Dept: NURSING | Facility: CLINIC | Age: 83
End: 2018-05-21
Payer: MEDICARE

## 2018-05-21 DIAGNOSIS — J84.112 IPF (IDIOPATHIC PULMONARY FIBROSIS) (H): ICD-10-CM

## 2018-05-21 PROCEDURE — 94618 PULMONARY STRESS TESTING: CPT | Performed by: INTERNAL MEDICINE

## 2018-05-21 PROCEDURE — 94375 RESPIRATORY FLOW VOLUME LOOP: CPT | Mod: 59 | Performed by: INTERNAL MEDICINE

## 2018-05-21 PROCEDURE — 94729 DIFFUSING CAPACITY: CPT | Performed by: INTERNAL MEDICINE

## 2018-05-21 PROCEDURE — 99207 ZZC DROP WITH A PROCEDURE: CPT | Performed by: INTERNAL MEDICINE

## 2018-05-21 NOTE — MR AVS SNAPSHOT
After Visit Summary   5/21/2018    Des Saravia    MRN: [id number]           Patient Information     Date Of Birth          1935        Visit Information        Provider Department      5/21/2018 11:00 AM PFT LAB [facility]        Today's Diagnoses     IPF (idiopathic pulmonary fibrosis) (H)           Follow-ups after your visit        Your next 10 appointments already scheduled     May 24, 2018  1:00 PM CDT   Return Visit with David Morris Perlman, MD   [facility] ([facility])    [address] [place] [id number] [phone]              Who to contact     If you have questions or need follow up information about today's clinic visit or your schedule please contact [facility] directly at [phone].  Normal or non-critical lab and imaging results will be communicated to you by MyChart, letter or phone within 4 business days after the clinic has received the results. If you do not hear from us within 7 days, please contact the clinic through MyChart or phone. If you have a critical or abnormal lab result, we will notify you by phone as soon as possible.  Submit refill requests through Conservis or call your pharmacy and they will forward the refill request to us. Please allow 3 business days for your refill to be completed.          Additional Information About Your Visit        Conservis Information     Conservis is an electronic gateway that provides easy, online access to your medical records. With Conservis, you can request a clinic appointment, read your test results, renew a prescription or communicate with your care team.     To sign up for Conservis visit the website at [email or web address]   You will be asked to enter the access code listed below, as well as some personal information. Please follow the directions to create your username and password.     Your access code is: [email or web address]  Expires:  2018 11:34 AM     Your access code will  in 90 days. If you need help or a new code, please contact your [facility] or call [phone] for assistance.        Care EveryWhere ID     This is your Care EveryWhere ID. This could be used by other organizations to access your [facility] medical records  [id number]         Blood Pressure from Last 3 Encounters:   18 126/75   17 132/78   17 140/81    Weight from Last 3 Encounters:   18 80.3 kg (177 lb)   17 80.3 kg (177 lb 1.6 oz)   17 79 kg (174 lb 3.2 oz)              We Performed the Following     6 minute walk test     General PFT Lab (Please always keep checked)     HC DIFFUSING CAPACITY     Pulmonary Function Test     RESPIRATORY FLOW VOLUME LOOP        Primary Care Provider Office Phone # Fax #    Omkar Mccarthy [phone]       [place] [address]        Equal Access to Services     NANCIE ARRIETA : Hadii aad ku hadasho Soomaali, waaxda luqadaha, qaybta kaalmada adeegyada, waxay idiin hayantonion ligia lebron . So [facility] [phone].    ATENCIÓN: Si habla español, tiene a painter disposición servicios gratuitos de asistencia lingüística. Llame al [phone].    We comply with applicable federal civil rights laws and [place] laws. We do not discriminate on the basis of race, color, national origin, age, disability, sex, sexual orientation, or gender identity.            Thank you!     Thank you for choosing [facility]  for your care. Our goal is always to provide you with excellent care. Hearing back from our patients is one way we can continue to improve our services. Please take a few minutes to complete the written survey that you may receive in the mail after your visit with us. Thank you!             Your Updated Medication List - Protect others around you: Learn how to safely use, store and throw away your medicines at  www.disposemymeds.org.          This list is accurate as of 5/21/18 11:34 AM.  Always use your most recent med list.                   Brand Name Dispense Instructions for use Diagnosis    APAP 325 MG Tabs      Take 975 mg by mouth every 4 hours as needed for mild pain    Acute post-operative pain       aspirin 81 MG tablet      Take 1 tablet by mouth daily.    IPF (idiopathic pulmonary fibrosis) (H)       LIPITOR 40 MG tablet   Generic drug:  atorvastatin      Take 1 tablet by mouth daily.    IPF (idiopathic pulmonary fibrosis) (H)       metoprolol succinate 25 MG 24 hr tablet    TOPROL-XL          Multiple vitamin Tabs      Take 1 tablet by mouth daily.    IPF (idiopathic pulmonary fibrosis) (H)       order for DME     1 Device    Please provide patient with second liquid oxygen reservoir.  Patient does not have enough oxygen to get through the weekend.    ILD (interstitial lung disease) (H)       PANTOPRAZOLE SODIUM PO      Take 40 mg by mouth 2 times daily (before meals)    IPF (idiopathic pulmonary fibrosis) (H)       tamsulosin 0.4 MG capsule    FLOMAX    30 capsule    Take 1 capsule (0.4 mg) by mouth daily    Urinary retention

## 2018-05-21 NOTE — PROGRESS NOTES
PFT Note: Completed FVC and DLCO per Dr. Perlman.    6 Minute Walk Note: Patient stated he did not want to do the 6 minute walk today due to it exhausting him too much. Patient stated he's only able to walk for 2 minutes and then he desaturates and can't walk anymore. Patient also stated he talked to Dr. Perlman about this at his last visit and was told he didn't need to do 6 minute walks anymore. Maybe this can be address at his next appointment on 5/24/2018.

## 2018-05-24 ENCOUNTER — OFFICE VISIT (OUTPATIENT)
Dept: PULMONOLOGY | Facility: CLINIC | Age: 83
End: 2018-05-24
Payer: MEDICARE

## 2018-05-24 ENCOUNTER — TELEPHONE (OUTPATIENT)
Dept: PULMONOLOGY | Facility: CLINIC | Age: 83
End: 2018-05-24

## 2018-05-24 VITALS
SYSTOLIC BLOOD PRESSURE: 133 MMHG | WEIGHT: 177 LBS | DIASTOLIC BLOOD PRESSURE: 84 MMHG | HEART RATE: 92 BPM | OXYGEN SATURATION: 93 % | BODY MASS INDEX: 25.04 KG/M2

## 2018-05-24 DIAGNOSIS — J84.112 IPF (IDIOPATHIC PULMONARY FIBROSIS) (H): Primary | ICD-10-CM

## 2018-05-24 PROCEDURE — 99213 OFFICE O/P EST LOW 20 MIN: CPT | Performed by: INTERNAL MEDICINE

## 2018-05-24 NOTE — TELEPHONE ENCOUNTER
"Per Dr. Perlman who saw patient in clinic today - patient is planning upcoming road trip to another state and is interested in obtaining portable oxygen to take with him. He uses [employer] for DME. [facility] and spoke to Liyah who states she will send a message to customer service and \"have someone contact our clinic back\". Will need to discuss options for patient and request [facility] contact patient to determine his desired option(s), cost, and rental details.    Attempted to contact patient and left detailed message advising him to contact his DME company [employer] to discuss rental options, costs, and details. Requested he contact the clinic with any further questions or concerns.    Ivana LORENZO, RN, BSN  Pulmonary Care Coordinator     "

## 2018-05-24 NOTE — TELEPHONE ENCOUNTER
Received call from Krystin at [facility]. Advised her of patient's request and she states they will contact patient to discuss options and let our clinic know if anything further is needed.    Ivana LORENZO RN, BSN  Pulmonary Care Coordinator

## 2018-05-24 NOTE — NURSING NOTE
Des Saravia's goals for this visit include: ILD  He requests these members of his care team be copied on today's visit information: yes    PCP: Omkar Mccarthy    Referring Provider:  No referring provider defined for this encounter.    /84 (BP Location: Left arm, Patient Position: Chair, Cuff Size: Adult Regular)  Pulse 92  Wt 80.3 kg (177 lb)  SpO2 93%  BMI 25.04 kg/m2    Do you need any medication refills at today's visit? Yes discuss oxygen

## 2018-05-24 NOTE — PROGRESS NOTES
Reason for Visit  Des Saravia is a 83 year old year old male who is being seen for Interstitial Lung Disease (ILD)    ILD HPI    Des Saravia is a 83-year-old male with idiopathic pulmonary fibrosis seen for follow-up.  He is elected not to pursue anti-fibrotic therapy after not tolerating pirfenidone well.  He currently uses oxygen typically 4 L with exertion.  Overall feels that he is doing relatively well he does feel that maybe there has been a slight decline in his breathing since the last visit.  However general he is able to do his activities of daily living and does not feel significantly limited.  Although in talking with the patient and his wife he is not that active.  Otherwise no other new complaints today      Current Outpatient Prescriptions   Medication     acetaminophen 325 MG TABS     aspirin 81 MG tablet     atorvastatin (LIPITOR) 40 MG tablet     metoprolol (TOPROL-XL) 25 MG 24 hr tablet     Multiple Vitamin TABS     order for DME     PANTOPRAZOLE SODIUM PO     tamsulosin (FLOMAX) 0.4 MG 24 hr capsule     No current facility-administered medications for this visit.      Allergies   Allergen Reactions     Lisinopril Cough     Past Medical History:   Diagnosis Date     CAD (coronary artery disease)     CABG in 1998     GERD (gastroesophageal reflux disease)      HTN (hypertension)      Hyperlipidaemia      IPF (idiopathic pulmonary fibrosis) (H)     diagnosed in 2008 by VATS lung biopsy; completed [id number] study in 2011 - received drug.  In IPF biomarkers study since 2013.  Pirfenidone started 1/2015, stopped 4/2015 due to rash.       Past Surgical History:   Procedure Laterality Date     CHOLECYSTECTOMY  2/2014     CORONARY ARTERY BYPASS  1998    4 vessel     THORACOSCOPIC BIOPSY LUNG  2009       Social History     Social History     Marital status:      Spouse name: N/A     Number of children: N/A     Years of education: N/A     Occupational History     Not on file.     Social History  Main Topics     Smoking status: Never Smoker     Smokeless tobacco: Never Used     Alcohol use No     Drug use: Not on file     Sexual activity: Not on file     Other Topics Concern     Not on file     Social History Narrative       No family history on file.    ROS Pulmonary    A complete ROS was otherwise negative except as noted in the HPI.  Vitals:    05/24/18 1304   BP: 133/84   BP Location: Left arm   Patient Position: Chair   Cuff Size: Adult Regular   Pulse: 92   SpO2: 93%   Weight: 80.3 kg (177 lb)     Exam:   GENERAL APPEARANCE: Well developed, well nourished, alert, and in no apparent distress.  NECK: supple, no masses, no thyromegaly.  LYMPHATICS: No significant axillary, cervical, or supraclavicular nodes.  RESP: good air flow throughout, - bibasilar crackles  CV: Normal S1, S2, regular rhythm, normal rate, no rub, no murmur,  no gallop, no LE edema.   ABDOMEN:  Bowel sounds normal, soft, nontender, no HSM or masses.   MS: extremities normal- no clubbing, no cyanosis.  SKIN: no rash on limited exam  NEURO: Mentation intact, speech normal, normal strength and tone, normal gait and stance  PSYCH: mentation appears normal. and affect normal/bright  Results: I have reviewed all imaging, PFTs and other relavent tests, please see below for details, PFT and imaging results were reviewed with the patient.  PFTs: moderate restriction, slight decrease form previous.     Assessment and plan:    83-year-old male with idiopathic pulmonary fibrosis stable to slightly declined primary function test.  Overall though his quality of life is good and I think at this point we would just continue to monitor him.  I will see him back in 4 months with pulmonary function tests we will work on getting him up with the concentrator to allow his car trips.      CBC   Recent Labs   Lab Test  01/17/16   0011  01/14/16   0526   RBC  4.67  4.67   HGB  14.2  14.3   HCT  42.0  42.4   PLT  247  219       Basic Metabolic Panel  Recent  Labs   Lab Test  01/16/16   1111  01/15/16   0635  01/14/16   0526   NA   --   140  140   POTASSIUM  4.1  3.7  3.4   CHLORIDE   --   104  104   CO2   --   27  28   BUN   --   18  21   GLC   --   99  102*   TEE   --   8.3*  8.2*       INR  Recent Labs   Lab Test  01/17/16   0011  11/01/15   0743   INR  1.19*  1.14       PFT  PFT Latest Ref Rng & Units 5/21/2018   FVC L 1.85   FEV1 L 1.60   FVC% % 48   FEV1% % 56           CC:    ]

## 2018-05-24 NOTE — MR AVS SNAPSHOT
After Visit Summary   5/24/2018    Des Saravia    MRN: [id number]           Patient Information     Date Of Birth          1935        Visit Information        Provider Department      5/24/2018 1:00 PM Perlman, David Morris, MD [facility]        Today's Diagnoses     IPF (idiopathic pulmonary fibrosis) (H)    -  1       Follow-ups after your visit        Your next 10 appointments already scheduled     Sep 27, 2018 11:00 AM CDT   SHORT with PFT LAB   [facility])    [address] [place] [id number] [phone]            Sep 27, 2018 11:30 AM CDT   Return Visit with David Morris Perlman, MD   [facility] ([facility])    [address] [place] [address] [id number] [phone]              Future tests that were ordered for you today     Open Future Orders        Priority Expected Expires Ordered    General PFT Lab (Please always keep checked) Routine  5/24/2019 5/24/2018    Pulmonary Function Test Routine  5/24/2019 5/24/2018            Who to contact     If you have questions or need follow up information about today's clinic visit or your schedule please contact [facility] directly at [phone].  Normal or non-critical lab and imaging results will be communicated to you by Solexahart, letter or phone within 4 business days after the clinic has received the results. If you do not hear from us within 7 days, please contact the clinic through Solexahart or phone. If you have a critical or abnormal lab result, we will notify you by phone as soon as possible.  Submit refill requests through Kirax or call your pharmacy and they will forward the refill request to us. Please allow 3 business days for your refill to be completed.          Additional Information About Your Visit        Kirax Information     Kirax is an electronic gateway that provides easy,  online access to your medical records. With [employer], you can request a clinic appointment, read your test results, renew a prescription or communicate with your care team.     To sign up for [employer] visit the website at [email or web address]   You will be asked to enter the access code listed below, as well as some personal information. Please follow the directions to create your username and password.     Your access code is: [email or web address]  Expires: 2018 11:34 AM     Your access code will  in 90 days. If you need help or a new code, please contact your [facility] or call [phone] for assistance.        Care EveryWhere ID     This is your Care EveryWhere ID. This could be used by other organizations to access your [place] medical records  [id number]        Your Vitals Were     Pulse Pulse Oximetry BMI (Body Mass Index)             92 93% 25.04 kg/m2          Blood Pressure from Last 3 Encounters:   18 133/84   18 126/75   17 132/78    Weight from Last 3 Encounters:   18 80.3 kg (177 lb)   18 80.3 kg (177 lb)   17 80.3 kg (177 lb 1.6 oz)               Primary Care Provider Office Phone # Fax #    Omkar Mccarthy [phone]       [place] [address]        Equal Access to Services     NANCIE ARRIETA AH: Hadii aad ku hadasho Sopatriziaali, waaxda luqadaha, qaybta kaalmada adeegyada, kirk vuong. So [facility] [phone].    ATENCIÓN: Si habla español, tiene a painter disposición servicios gratuitos de asistencia lingüística. Llame al [phone].    We comply with applicable federal civil rights laws and [place] laws. We do not discriminate on the basis of race, color, national origin, age, disability, sex, sexual orientation, or gender identity.            Thank you!     Thank you for choosing [facility]  for your care. Our goal is always to provide you with  excellent care. Hearing back from our patients is one way we can continue to improve our services. Please take a few minutes to complete the written survey that you may receive in the mail after your visit with us. Thank you!             Your Updated Medication List - Protect others around you: Learn how to safely use, store and throw away your medicines at www.disposemymeds.org.          This list is accurate as of 5/24/18  1:56 PM.  Always use your most recent med list.                   Brand Name Dispense Instructions for use Diagnosis    APAP 325 MG Tabs      Take 975 mg by mouth every 4 hours as needed for mild pain    Acute post-operative pain       aspirin 81 MG tablet      Take 1 tablet by mouth daily.    IPF (idiopathic pulmonary fibrosis) (H)       LIPITOR 40 MG tablet   Generic drug:  atorvastatin      Take 1 tablet by mouth daily.    IPF (idiopathic pulmonary fibrosis) (H)       metoprolol succinate 25 MG 24 hr tablet    TOPROL-XL          Multiple vitamin Tabs      Take 1 tablet by mouth daily.    IPF (idiopathic pulmonary fibrosis) (H)       order for DME     1 Device    Please provide patient with second liquid oxygen reservoir.  Patient does not have enough oxygen to get through the weekend.    ILD (interstitial lung disease) (H)       PANTOPRAZOLE SODIUM PO      Take 40 mg by mouth 2 times daily (before meals)    IPF (idiopathic pulmonary fibrosis) (H)       tamsulosin 0.4 MG capsule    FLOMAX    30 capsule    Take 1 capsule (0.4 mg) by mouth daily    Urinary retention

## 2018-05-25 LAB
DLCOUNC-%PRED-PRE: 30 %
DLCOUNC-PRE: 7.09 ML/MIN/MMHG
DLCOUNC-PRED: 23.33 ML/MIN/MMHG
ERV-%PRED-PRE: 50 %
ERV-PRE: 0.54 L
ERV-PRED: 1.06 L
EXPTIME-PRE: 4.97 SEC
FEF2575-%PRED-PRE: 103 %
FEF2575-PRE: 2 L/SEC
FEF2575-PRED: 1.94 L/SEC
FEFMAX-%PRED-PRE: 102 %
FEFMAX-PRE: 7.12 L/SEC
FEFMAX-PRED: 6.93 L/SEC
FEV1-%PRED-PRE: 56 %
FEV1-PRE: 1.6 L
FEV1FEV6-PRE: 86 %
FEV1FEV6-PRED: 76 %
FEV1FVC-PRE: 86 %
FEV1FVC-PRED: 71 %
FEV1SVC-PRE: 97 %
FEV1SVC-PRED: 62 %
FIFMAX-PRE: 4.78 L/SEC
FVC-%PRED-PRE: 48 %
FVC-PRE: 1.85 L
FVC-PRED: 3.85 L
IC-%PRED-PRE: 32 %
IC-PRE: 1.12 L
IC-PRED: 3.49 L
VA-%PRED-PRE: 41 %
VA-PRE: 2.85 L
VC-%PRED-PRE: 36 %
VC-PRE: 1.66 L
VC-PRED: 4.55 L

## 2018-09-27 ENCOUNTER — OFFICE VISIT (OUTPATIENT)
Dept: NURSING | Facility: CLINIC | Age: 83
End: 2018-09-27
Payer: MEDICARE

## 2018-09-27 ENCOUNTER — OFFICE VISIT (OUTPATIENT)
Dept: PULMONOLOGY | Facility: CLINIC | Age: 83
End: 2018-09-27
Payer: MEDICARE

## 2018-09-27 VITALS
HEIGHT: 71 IN | OXYGEN SATURATION: 93 % | TEMPERATURE: 97.1 F | WEIGHT: 186 LBS | SYSTOLIC BLOOD PRESSURE: 141 MMHG | RESPIRATION RATE: 20 BRPM | DIASTOLIC BLOOD PRESSURE: 81 MMHG | HEART RATE: 78 BPM | BODY MASS INDEX: 26.04 KG/M2

## 2018-09-27 DIAGNOSIS — J84.112 IPF (IDIOPATHIC PULMONARY FIBROSIS) (H): ICD-10-CM

## 2018-09-27 DIAGNOSIS — J84.112 IPF (IDIOPATHIC PULMONARY FIBROSIS) (H): Primary | ICD-10-CM

## 2018-09-27 PROCEDURE — 94375 RESPIRATORY FLOW VOLUME LOOP: CPT | Performed by: INTERNAL MEDICINE

## 2018-09-27 PROCEDURE — 99207 ZZC DROP WITH A PROCEDURE: CPT | Performed by: INTERNAL MEDICINE

## 2018-09-27 PROCEDURE — 94729 DIFFUSING CAPACITY: CPT | Performed by: INTERNAL MEDICINE

## 2018-09-27 PROCEDURE — 99214 OFFICE O/P EST MOD 30 MIN: CPT | Mod: 25 | Performed by: INTERNAL MEDICINE

## 2018-09-27 RX ORDER — CODEINE PHOSPHATE AND GUAIFENESIN 10; 100 MG/5ML; MG/5ML
1-2 SOLUTION ORAL EVERY 4 HOURS PRN
COMMUNITY

## 2018-09-27 ASSESSMENT — PAIN SCALES - GENERAL: PAINLEVEL: NO PAIN (0)

## 2018-09-27 NOTE — PROGRESS NOTES
Reason for Visit  Des Saravia is a 83 year old year old male who is being seen for RECHECK (4 mo F/U ILD)    ILD HPI    Des Saravia is a 83-year-old male with history of idiopathic pulmonary fibrosis seen today for follow-up.  He has fairly advanced disease requiring oxygen 24/7 up to 4 L continuous.  He did not tolerate pirfenidone and therefore we have not had him on any anti-fibrotic therapy.  He returns clinic today overall stating that he is doing reasonably well although I think he feels that his breathing continues to get slightly worse.  He has a liquid oxygen which he uses 4 L pulse when he is out of the house.  He uses his concentrator at home.  He is pretty significant dyspnea with mild to moderate exertion.  He does have a scooter.  He is generally able to get around and do his activities of daily living he still is able to travel in the car with his wife and they have driven to [place] several times this summer.      Current Outpatient Prescriptions   Medication     acetaminophen 325 MG TABS     aspirin 81 MG tablet     atorvastatin (LIPITOR) 40 MG tablet     guaiFENesin-codeine (ROBITUSSIN AC) 100-10 MG/5ML SOLN solution     metoprolol (TOPROL-XL) 25 MG 24 hr tablet     Multiple Vitamin TABS     order for DME     PANTOPRAZOLE SODIUM PO     tamsulosin (FLOMAX) 0.4 MG 24 hr capsule     No current facility-administered medications for this visit.      Allergies   Allergen Reactions     Lisinopril Cough     Past Medical History:   Diagnosis Date     CAD (coronary artery disease)     CABG in 1998     GERD (gastroesophageal reflux disease)      HTN (hypertension)      Hyperlipidaemia      IPF (idiopathic pulmonary fibrosis) (H)     diagnosed in 2008 by VATS lung biopsy; completed UHF3212 study in 2011 - received drug.  In IPF biomarkers study since 2013.  Pirfenidone started 1/2015, stopped 4/2015 due to rash.       Past Surgical History:   Procedure Laterality Date     CHOLECYSTECTOMY  2/2014      "CORONARY ARTERY BYPASS  1998    4 vessel     THORACOSCOPIC BIOPSY LUNG  2009       Social History     Social History     Marital status:      Spouse name: N/A     Number of children: N/A     Years of education: N/A     Occupational History     Not on file.     Social History Main Topics     Smoking status: Never Smoker     Smokeless tobacco: Never Used     Alcohol use No     Drug use: Not on file     Sexual activity: Not on file     Other Topics Concern     Not on file     Social History Narrative       No family history on file.    ROS Pulmonary    A complete ROS was otherwise negative except as noted in the HPI.  Vitals:    09/27/18 1126   BP: 141/81   Pulse: 78   Resp: 20   Temp: 97.1  F (36.2  C)   SpO2: 93%   Weight: 84.4 kg (186 lb)   Height: 1.803 m (5' 11\")     Exam:   GENERAL APPEARANCE: Well developed, well nourished, alert, and in no apparent distress.  NECK: supple, no masses, no thyromegaly.  LYMPHATICS: No significant axillary, cervical, or supraclavicular nodes.  RESP: good air flow throughout, - bilateral crackles  CV: Normal S1, S2, regular rhythm, normal rate, no rub, no murmur,  no gallop, no LE edema.   ABDOMEN:  Bowel sounds normal, soft, nontender, no HSM or masses.   MS: extremities normal- no clubbing, no cyanosis.  SKIN: no rash on limited exam  NEURO: Mentation intact, speech normal, normal strength and tone, normal gait and stance  PSYCH: mentation appears normal. and affect normal/bright  Results: I have reviewed all imaging, PFTs and other relavent tests, please see below for details, PFT and imaging results were reviewed with the patient.  PFTs: Moderate to severe restriction slightly decreased from previous    Assessment and plan:    A 3-year-old male with advanced idiopathic pulmonary fibrosis.  We did discuss use of oxygen I think that using the pulse device is probably not sufficient for him and encouraged him to use continuous flow whenever he is exerting himself and then he " can turn the portable device back to pulse when he is resting.  We did also discuss some palliation with low-dose opioids he will consider this but does not feel he needs it at this point.  In general he is able to do his activities of daily living and feels his quality of life is good.  Therefore will not make any changes I will see him back in 4 months with pulmonary function test.      CBC   Recent Labs   Lab Test  01/17/16   0011  01/14/16   0526   RBC  4.67  4.67   HGB  14.2  14.3   HCT  42.0  42.4   PLT  247  219       Basic Metabolic Panel  Recent Labs   Lab Test  01/16/16   1111  01/15/16   0635  01/14/16   0526   NA   --   140  140   POTASSIUM  4.1  3.7  3.4   CHLORIDE   --   104  104   CO2   --   27  28   BUN   --   18  21   GLC   --   99  102*   TEE   --   8.3*  8.2*       INR  Recent Labs   Lab Test  01/17/16   0011  11/01/15   0743   INR  1.19*  1.14       PFT  PFT Latest Ref Rng & Units 9/27/2018   FVC L 1.68   FEV1 L 1.38   FVC% % 43   FEV1% % 48           CC:

## 2018-09-27 NOTE — MR AVS SNAPSHOT
After Visit Summary   9/27/2018    Des Saravia    MRN: [id number]           Patient Information     Date Of Birth          1935        Visit Information        Provider Department      9/27/2018 11:00 AM PFT LAB [facility]        Today's Diagnoses     IPF (idiopathic pulmonary fibrosis) (H)           Follow-ups after your visit        Your next 10 appointments already scheduled     Sep 27, 2018 11:30 AM CDT   Return Visit with David Morris Perlman, MD   [facility] ([facility])    [address] [place] [id number] [phone]              Who to contact     If you have questions or need follow up information about today's clinic visit or your schedule please contact [facility] directly at [phone].  Normal or non-critical lab and imaging results will be communicated to you by MyChart, letter or phone within 4 business days after the clinic has received the results. If you do not hear from us within 7 days, please contact the clinic through MyChart or phone. If you have a critical or abnormal lab result, we will notify you by phone as soon as possible.  Submit refill requests through [facility] or call your pharmacy and they will forward the refill request to us. Please allow 3 business days for your refill to be completed.          Additional Information About Your Visit        [facility] Information     [facility] is an electronic gateway that provides easy, online access to your medical records. With [facility], you can request a clinic appointment, read your test results, renew a prescription or communicate with your care team.     To sign up for [facility] visit the website at [email or web address]   You will be asked to enter the access code listed below, as well as some personal information. Please follow the directions to create your username and password.     Your access code is: [email or web address]  Expires:  2018 11:28 AM     Your access code will  in 90 days. If you need help or a new code, please contact your [facility] or call [phone] for assistance.        Care EveryWhere ID     This is your Care EveryWhere ID. This could be used by other organizations to access your [place] medical records  [id number]         Blood Pressure from Last 3 Encounters:   18 133/84   18 126/75   17 132/78    Weight from Last 3 Encounters:   18 80.3 kg (177 lb)   18 80.3 kg (177 lb)   17 80.3 kg (177 lb 1.6 oz)              We Performed the Following     General PFT Lab (Please always keep checked)     HC DIFFUSING CAPACITY     Pulmonary Function Test     RESPIRATORY FLOW VOLUME LOOP        Primary Care Provider Office Phone # Fax #    Omkar Mccarthy [phone]       [address] [address]        Equal Access to Services     NANCIE ARRIETA : Hadii aad ku hadasho Soomaali, waaxda luqadaha, qaybta kaalmada adeegyada, waxay idiin hayaan ligia lebron . So [facility] [phone].    ATENCIÓN: Si habla español, tiene a painter disposición servicios gratuitos de asistencia lingüística. Llame al [phone].    We comply with applicable federal civil rights laws and [place] laws. We do not discriminate on the basis of race, color, national origin, age, disability, sex, sexual orientation, or gender identity.            Thank you!     Thank you for choosing [facility]  for your care. Our goal is always to provide you with excellent care. Hearing back from our patients is one way we can continue to improve our services. Please take a few minutes to complete the written survey that you may receive in the mail after your visit with us. Thank you!             Your Updated Medication List - Protect others around you: Learn how to safely use, store and throw away your medicines at www.disposemymeds.org.           This list is accurate as of 9/27/18 11:28 AM.  Always use your most recent med list.                   Brand Name Dispense Instructions for use Diagnosis    APAP 325 MG Tabs      Take 975 mg by mouth every 4 hours as needed for mild pain    Acute post-operative pain       aspirin 81 MG tablet      Take 1 tablet by mouth daily.    IPF (idiopathic pulmonary fibrosis) (H)       guaiFENesin-codeine 100-10 MG/5ML Soln solution    ROBITUSSIN AC     Take 1-2 tsp. by mouth every 4 hours as needed for congestion        LIPITOR 40 MG tablet   Generic drug:  atorvastatin      Take 1 tablet by mouth daily.    IPF (idiopathic pulmonary fibrosis) (H)       metoprolol succinate 25 MG 24 hr tablet    TOPROL-XL          Multiple vitamin Tabs      Take 1 tablet by mouth daily.    IPF (idiopathic pulmonary fibrosis) (H)       order for DME     1 Device    Please provide patient with second liquid oxygen reservoir.  Patient does not have enough oxygen to get through the weekend.    ILD (interstitial lung disease) (H)       PANTOPRAZOLE SODIUM PO      Take 40 mg by mouth 2 times daily (before meals)    IPF (idiopathic pulmonary fibrosis) (H)       tamsulosin 0.4 MG capsule    FLOMAX    30 capsule    Take 1 capsule (0.4 mg) by mouth daily    Urinary retention

## 2018-09-27 NOTE — MR AVS SNAPSHOT
After Visit Summary   9/27/2018    Des Saravia    MRN: [id number]           Patient Information     Date Of Birth          1935        Visit Information        Provider Department      9/27/2018 11:30 AM Perlman, David Morris, MD [facility]        Today's Diagnoses     IPF (idiopathic pulmonary fibrosis) (H)    -  1       Follow-ups after your visit        Your next 10 appointments already scheduled     Jan 24, 2019 10:30 AM CST   Office Visit with PFT LAB   [facility])    [address] [place] [id number] [phone]           Bring a current list of meds and any records pertaining to this visit. For Physicals, please bring immunization records and any forms needing to be filled out. Please arrive 10 minutes early to complete paperwork.            Jan 24, 2019 11:30 AM CST   Return Visit with David Morris Perlman, MD   [facility] ([facility])    [address] [place] [id number] [phone]              Future tests that were ordered for you today     Open Future Orders        Priority Expected Expires Ordered    General PFT Lab (Please always keep checked) Routine  9/27/2019 9/27/2018    Pulmonary Function Test Routine  9/27/2019 9/27/2018            Who to contact     If you have questions or need follow up information about today's clinic visit or your schedule please contact [facility] directly at [phone].  Normal or non-critical lab and imaging results will be communicated to you by MyChart, letter or phone within 4 business days after the clinic has received the results. If you do not hear from us within 7 days, please contact the clinic through MyChart or phone. If you have a critical or abnormal lab result, we will notify you by phone as soon as possible.  Submit refill requests through [employer] or call your pharmacy and they will  "forward the refill request to us. Please allow 3 business days for your refill to be completed.          Additional Information About Your Visit        Matchfundhart Information     [facility] is an electronic gateway that provides easy, online access to your medical records. With [facility], you can request a clinic appointment, read your test results, renew a prescription or communicate with your care team.     To sign up for [facility] visit the website at [email or web address]   You will be asked to enter the access code listed below, as well as some personal information. Please follow the directions to create your username and password.     Your access code is: [email or web address]  Expires: 2018 11:28 AM     Your access code will  in 90 days. If you need help or a new code, please contact your [facility] or call [phone] for assistance.        Care EveryWhere ID     This is your Care EveryWhere ID. This could be used by other organizations to access your [place] medical records  [id number]        Your Vitals Were     Pulse Temperature Respirations Height Pulse Oximetry BMI (Body Mass Index)    78 97.1  F (36.2  C) 20 1.803 m (5' 11\") 93% 25.94 kg/m2       Blood Pressure from Last 3 Encounters:   18 141/81   18 133/84   18 126/75    Weight from Last 3 Encounters:   18 84.4 kg (186 lb)   18 80.3 kg (177 lb)   18 80.3 kg (177 lb)               Primary Care Provider Office Phone # Fax #    Omkar Mccarthy [phone]       [address] [address]        Equal Access to Services     SUKUMAR ARRIETA AH: Hadii sage Fernandes, waottoda luqadaha, qaybta kaalmada santana, kirk vuong. So [facility] [phone].    ATENCIÓN: Si habla español, tiene a painter disposición servicios gratuitos de asistencia lingüística. Llame al [phone].    We comply with applicable federal civil rights laws " and [place] laws. We do not discriminate on the basis of race, color, national origin, age, disability, sex, sexual orientation, or gender identity.            Thank you!     Thank you for choosing [facility]  for your care. Our goal is always to provide you with excellent care. Hearing back from our patients is one way we can continue to improve our services. Please take a few minutes to complete the written survey that you may receive in the mail after your visit with us. Thank you!             Your Updated Medication List - Protect others around you: Learn how to safely use, store and throw away your medicines at www.disposemymeds.org.          This list is accurate as of 9/27/18 12:00 PM.  Always use your most recent med list.                   Brand Name Dispense Instructions for use Diagnosis    APAP 325 MG Tabs      Take 975 mg by mouth every 4 hours as needed for mild pain    Acute post-operative pain       aspirin 81 MG tablet      Take 1 tablet by mouth daily.    IPF (idiopathic pulmonary fibrosis) (H)       guaiFENesin-codeine 100-10 MG/5ML Soln solution    ROBITUSSIN AC     Take 1-2 tsp. by mouth every 4 hours as needed for congestion        LIPITOR 40 MG tablet   Generic drug:  atorvastatin      Take 1 tablet by mouth daily.    IPF (idiopathic pulmonary fibrosis) (H)       metoprolol succinate 25 MG 24 hr tablet    TOPROL-XL          Multiple vitamin Tabs      Take 1 tablet by mouth daily.    IPF (idiopathic pulmonary fibrosis) (H)       order for DME     1 Device    Please provide patient with second liquid oxygen reservoir.  Patient does not have enough oxygen to get through the weekend.    ILD (interstitial lung disease) (H)       PANTOPRAZOLE SODIUM PO      Take 40 mg by mouth 2 times daily (before meals)    IPF (idiopathic pulmonary fibrosis) (H)       tamsulosin 0.4 MG capsule    FLOMAX    30 capsule    Take 1 capsule (0.4 mg) by mouth daily    Urinary retention

## 2018-09-27 NOTE — NURSING NOTE
"Des Saravia's goals for this visit include: Return  He requests these members of his care team be copied on today's visit information: PCP    PCP: Omkar Mccarthy    Referring Provider:  No referring provider defined for this encounter.    /81  Pulse 78  Temp 97.1  F (36.2  C)  Resp 20  Ht 1.803 m (5' 11\")  Wt 84.4 kg (186 lb)  SpO2 93%  BMI 25.94 kg/m2    Do you need any medication refills at today's visit? N    "

## 2018-09-28 LAB
DLCOUNC-%PRED-PRE: 28 %
DLCOUNC-PRE: 6.74 ML/MIN/MMHG
DLCOUNC-PRED: 23.25 ML/MIN/MMHG
ERV-%PRED-PRE: 47 %
ERV-PRE: 0.46 L
ERV-PRED: 0.97 L
EXPTIME-PRE: 6.58 SEC
FEF2575-%PRED-PRE: 77 %
FEF2575-PRE: 1.5 L/SEC
FEF2575-PRED: 1.93 L/SEC
FEFMAX-%PRED-PRE: 102 %
FEFMAX-PRE: 7.02 L/SEC
FEFMAX-PRED: 6.88 L/SEC
FEV1-%PRED-PRE: 48 %
FEV1-PRE: 1.38 L
FEV1FEV6-PRE: 82 %
FEV1FEV6-PRED: 76 %
FEV1FVC-PRE: 82 %
FEV1FVC-PRED: 71 %
FEV1SVC-PRE: 86 %
FEV1SVC-PRED: 62 %
FIFMAX-PRE: 4 L/SEC
FVC-%PRED-PRE: 43 %
FVC-PRE: 1.68 L
FVC-PRED: 3.84 L
IC-%PRED-PRE: 31 %
IC-PRE: 1.14 L
IC-PRED: 3.57 L
VA-%PRED-PRE: 37 %
VA-PRE: 2.58 L
VC-%PRED-PRE: 35 %
VC-PRE: 1.6 L
VC-PRED: 4.54 L

## 2019-01-01 ENCOUNTER — TELEPHONE (OUTPATIENT)
Dept: PULMONOLOGY | Facility: CLINIC | Age: 84
End: 2019-01-01

## 2019-01-01 ENCOUNTER — OFFICE VISIT (OUTPATIENT)
Dept: NURSING | Facility: CLINIC | Age: 84
End: 2019-01-01
Payer: COMMERCIAL

## 2019-01-01 ENCOUNTER — OFFICE VISIT (OUTPATIENT)
Dept: PULMONOLOGY | Facility: CLINIC | Age: 84
End: 2019-01-01
Payer: COMMERCIAL

## 2019-01-01 ENCOUNTER — HEALTH MAINTENANCE LETTER (OUTPATIENT)
Age: 84
End: 2019-01-01

## 2019-01-01 VITALS
SYSTOLIC BLOOD PRESSURE: 115 MMHG | DIASTOLIC BLOOD PRESSURE: 73 MMHG | HEIGHT: 71 IN | WEIGHT: 170.8 LBS | OXYGEN SATURATION: 95 % | BODY MASS INDEX: 23.91 KG/M2 | HEART RATE: 83 BPM | RESPIRATION RATE: 24 BRPM

## 2019-01-01 DIAGNOSIS — J84.112 IPF (IDIOPATHIC PULMONARY FIBROSIS) (H): Primary | ICD-10-CM

## 2019-01-01 LAB
DLCOUNC-%PRED-PRE: 24 %
DLCOUNC-PRE: 5.83 ML/MIN/MMHG
DLCOUNC-PRED: 24.21 ML/MIN/MMHG
ERV-%PRED-PRE: 35 %
ERV-PRE: 0.39 L
ERV-PRED: 1.09 L
EXPTIME-PRE: 5.64 SEC
FEF2575-%PRED-PRE: 115 %
FEF2575-PRE: 2.21 L/SEC
FEF2575-PRED: 1.92 L/SEC
FEFMAX-%PRED-PRE: 98 %
FEFMAX-PRE: 6.72 L/SEC
FEFMAX-PRED: 6.83 L/SEC
FEV1-%PRED-PRE: 53 %
FEV1-PRE: 1.5 L
FEV1FEV6-PRE: 87 %
FEV1FEV6-PRED: 76 %
FEV1FVC-PRE: 87 %
FEV1FVC-PRED: 74 %
FEV1SVC-PRE: 96 %
FEV1SVC-PRED: 62 %
FIFMAX-PRE: 2.41 L/SEC
FVC-%PRED-PRE: 44 %
FVC-PRE: 1.72 L
FVC-PRED: 3.83 L
IC-%PRED-PRE: 34 %
IC-PRE: 1.18 L
IC-PRED: 3.44 L
VA-%PRED-PRE: 42 %
VA-PRE: 2.71 L
VC-%PRED-PRE: 34 %
VC-PRE: 1.56 L
VC-PRED: 4.53 L

## 2019-01-01 PROCEDURE — 94375 RESPIRATORY FLOW VOLUME LOOP: CPT | Performed by: INTERNAL MEDICINE

## 2019-01-01 PROCEDURE — 94729 DIFFUSING CAPACITY: CPT | Performed by: INTERNAL MEDICINE

## 2019-01-01 PROCEDURE — 99213 OFFICE O/P EST LOW 20 MIN: CPT | Performed by: INTERNAL MEDICINE

## 2019-01-01 ASSESSMENT — PAIN SCALES - GENERAL: PAINLEVEL: NO PAIN (0)

## 2019-01-01 ASSESSMENT — MIFFLIN-ST. JEOR: SCORE: 1486.87

## 2019-01-24 ENCOUNTER — TELEPHONE (OUTPATIENT)
Dept: PULMONOLOGY | Facility: CLINIC | Age: 84
End: 2019-01-24

## 2019-01-24 ENCOUNTER — OFFICE VISIT (OUTPATIENT)
Dept: NURSING | Facility: CLINIC | Age: 84
End: 2019-01-24
Payer: COMMERCIAL

## 2019-01-24 ENCOUNTER — OFFICE VISIT (OUTPATIENT)
Dept: PULMONOLOGY | Facility: CLINIC | Age: 84
End: 2019-01-24
Payer: COMMERCIAL

## 2019-01-24 ENCOUNTER — MEDICAL CORRESPONDENCE (OUTPATIENT)
Dept: HEALTH INFORMATION MANAGEMENT | Facility: CLINIC | Age: 84
End: 2019-01-24

## 2019-01-24 VITALS
BODY MASS INDEX: 25.16 KG/M2 | HEIGHT: 71 IN | WEIGHT: 179.7 LBS | RESPIRATION RATE: 20 BRPM | HEART RATE: 85 BPM | OXYGEN SATURATION: 99 % | TEMPERATURE: 97.6 F

## 2019-01-24 DIAGNOSIS — J84.112 IPF (IDIOPATHIC PULMONARY FIBROSIS) (H): Primary | ICD-10-CM

## 2019-01-24 DIAGNOSIS — J84.112 IPF (IDIOPATHIC PULMONARY FIBROSIS) (H): ICD-10-CM

## 2019-01-24 LAB
6 MIN WALK (FT): 120 FT
6 MIN WALK (M): 37 M
DLCOUNC-%PRED-PRE: 26 %
DLCOUNC-PRE: 6.2 ML/MIN/MMHG
DLCOUNC-PRED: 23.17 ML/MIN/MMHG
ERV-%PRED-PRE: 69 %
ERV-PRE: 0.7 L
ERV-PRED: 1.01 L
EXPTIME-PRE: 6.29 SEC
FEF2575-%PRED-PRE: 100 %
FEF2575-PRE: 1.93 L/SEC
FEF2575-PRED: 1.92 L/SEC
FEFMAX-%PRED-PRE: 99 %
FEFMAX-PRE: 6.77 L/SEC
FEFMAX-PRED: 6.84 L/SEC
FEV1-%PRED-PRE: 54 %
FEV1-PRE: 1.52 L
FEV1FEV6-PRE: 84 %
FEV1FEV6-PRED: 76 %
FEV1FVC-PRE: 85 %
FEV1FVC-PRED: 71 %
FEV1SVC-PRE: 85 %
FEV1SVC-PRED: 62 %
FIFMAX-PRE: 2.16 L/SEC
FVC-%PRED-PRE: 47 %
FVC-PRE: 1.8 L
FVC-PRED: 3.83 L
IC-%PRED-PRE: 31 %
IC-PRE: 1.1 L
IC-PRED: 3.52 L
VA-%PRED-PRE: 42 %
VA-PRE: 2.93 L
VC-%PRED-PRE: 39 %
VC-PRE: 1.8 L
VC-PRED: 4.53 L

## 2019-01-24 PROCEDURE — 99214 OFFICE O/P EST MOD 30 MIN: CPT | Mod: 25 | Performed by: INTERNAL MEDICINE

## 2019-01-24 PROCEDURE — 94729 DIFFUSING CAPACITY: CPT | Performed by: INTERNAL MEDICINE

## 2019-01-24 PROCEDURE — 94375 RESPIRATORY FLOW VOLUME LOOP: CPT | Performed by: INTERNAL MEDICINE

## 2019-01-24 PROCEDURE — 94618 PULMONARY STRESS TESTING: CPT | Performed by: INTERNAL MEDICINE

## 2019-01-24 ASSESSMENT — PAIN SCALES - GENERAL: PAINLEVEL: NO PAIN (0)

## 2019-01-24 ASSESSMENT — MIFFLIN-ST. JEOR: SCORE: 1532.24

## 2019-01-24 NOTE — TELEPHONE ENCOUNTER
Called [facility] to discuss patient's need for a larger home oxygen concentrator to support his increased oxygen needs. LM, awaiting callback.     Oxygen prescription, testing and note has been faxed to [facility]. [facility]'s fax number is not working, called to confirm number, call center states it is correct.  Will await call back from local [facility] to fax information.     Lindsay Marie RN   Pulmonary/Rheumatology Care Coordinator  [facility]

## 2019-01-24 NOTE — PATIENT INSTRUCTIONS
Alek Marie, RN Care Coordinator  [phone]  Mondays, Tuesdays, Thursdays    [facility]. [facility] will contact you regarding oxygen delivery.     Please call with any questions.

## 2019-01-24 NOTE — PROGRESS NOTES
Reason for Visit  Des Saravia is a 83 year old year old male who is being seen for RECHECK (4 month F/U idiopathic fibrosis)    ILD HPI    Des Saravia is a 83-year-old male with idiopathic pulmonary fibrosis who is seen today for follow-up.  The patient was not able to tolerate anti-fibrotic therapy so he is not on any treatment.  He is only on supplemental oxygen.  He is using his portable liquid oxygen at 4 L pulse when he is outside of the home.  He has a stationary concentrator at home that goes up to 5 L.  He has fairly advanced disease and was noted to be hypoxic on his 4 L pulse by our respiratory therapist when he came for his PFTs.  In talking with the patient and wife he is relatively stable however he is quite short of breath even just moving around the home.  When he is outside the home he generally uses his scooter.  Does have mild to moderate cough as well.  Overall does feel relatively stable compared to his previous visit.      Current Outpatient Medications   Medication     acetaminophen 325 MG TABS     aspirin 81 MG tablet     atorvastatin (LIPITOR) 40 MG tablet     guaiFENesin-codeine (ROBITUSSIN AC) 100-10 MG/5ML SOLN solution     metoprolol (TOPROL-XL) 25 MG 24 hr tablet     Multiple Vitamin TABS     order for DME     PANTOPRAZOLE SODIUM PO     tamsulosin (FLOMAX) 0.4 MG 24 hr capsule     No current facility-administered medications for this visit.      Allergies   Allergen Reactions     Lisinopril Cough     Past Medical History:   Diagnosis Date     CAD (coronary artery disease)     CABG in 1998     GERD (gastroesophageal reflux disease)      HTN (hypertension)      Hyperlipidaemia      IPF (idiopathic pulmonary fibrosis) (H)     diagnosed in 2008 by VATS lung biopsy; completed [id number] study in 2011 - received drug.  In IPF biomarkers study since 2013.  Pirfenidone started 1/2015, stopped 4/2015 due to rash.       Past Surgical History:   Procedure Laterality Date     CHOLECYSTECTOMY   "2/2014     CORONARY ARTERY BYPASS  1998    4 vessel     THORACOSCOPIC BIOPSY LUNG  2009       Social History     Socioeconomic History     Marital status:      Spouse name: Not on file     Number of children: Not on file     Years of education: Not on file     Highest education level: Not on file   Social Needs     Financial resource strain: Not on file     Food insecurity - worry: Not on file     Food insecurity - inability: Not on file     Transportation needs - medical: Not on file     Transportation needs - non-medical: Not on file   Occupational History     Not on file   Tobacco Use     Smoking status: Never Smoker     Smokeless tobacco: Never Used   Substance and Sexual Activity     Alcohol use: No     Alcohol/week: 0.0 oz     Drug use: Not on file     Sexual activity: Not on file   Other Topics Concern     Parent/sibling w/ CABG, MI or angioplasty before 65F 55M? Not Asked   Social History Narrative     Not on file       No family history on file.    ROS Pulmonary    A complete ROS was otherwise negative except as noted in the HPI.  Vitals:    01/24/19 1111   Pulse: 85   Resp: 20   Temp: 97.6  F (36.4  C)   SpO2: 99%   Weight: 81.5 kg (179 lb 11.2 oz)   Height: 1.803 m (5' 11\")     Exam:   GENERAL APPEARANCE: Well developed, well nourished, alert, and in no apparent distress.  NECK: supple, no masses, no thyromegaly.  LYMPHATICS: No significant axillary, cervical, or supraclavicular nodes.  RESP: good air flow throughout, - bibasilar crackles  CV: Normal S1, S2, regular rhythm, normal rate, no rub, no murmur,  no gallop, no LE edema.   ABDOMEN:  Bowel sounds normal, soft, nontender, no HSM or masses.   MS: extremities normal- no clubbing, no cyanosis.  SKIN: no rash on limited exam  PSYCH: mentation appears normal. and affect normal/bright  Results: I have reviewed all imaging, PFTs and other relavent tests, please see below for details, PFT and imaging results were reviewed with the patient.  PFTs: " Moderate restriction with severe reduction in diffusing capacity.  These are overall stable from previous.  On 6-minute walk test on 4 L continuous the patient desatted down to 84%.  On 6 L he maintained his oxygenation for most the walk however did drop to 86% at the very end.    Assessment and plan:    83-year-old male with idiopathic pulmonary fibrosis.  Overall relatively stable but significantly advanced disease.  Discussed oxygen use with the patient today we will get him a higher flow stationary concentrator at home as he likely need 6-8 L with exertion.  We also discussed that continuous flow will be necessary from his portable device and he cannot use it on the pulse mode as this will not provide him sufficient oxygen.  The patient understood that and will comply with this.  Plan to see him back in 6 months with pulmonary function test he can call sooner with any changes in his breathing.      CBC   Recent Labs   Lab Test 01/17/16  0011 01/14/16  0526   RBC 4.67 4.67   HGB 14.2 14.3   HCT 42.0 42.4    219       Basic Metabolic Panel  Recent Labs   Lab Test 01/16/16  1111 01/15/16  0635 01/14/16  0526   NA  --  140 140   POTASSIUM 4.1 3.7 3.4   CHLORIDE  --  104 104   CO2  --  27 28   BUN  --  18 21   GLC  --  99 102*   TEE  --  8.3* 8.2*       INR  Recent Labs   Lab Test 01/17/16  0011 11/01/15  0743   INR 1.19* 1.14       PFT  PFT Latest Ref Rng & Units 1/24/2019   FVC L 1.80   FEV1 L 1.52   FVC% % 47   FEV1% % 54           CC:

## 2019-01-24 NOTE — NURSING NOTE
"Des Saravia's goals for this visit include: Return  He requests these members of his care team be copied on today's visit information: PCP    PCP: Omkar Mccarthy    Referring Provider:  No referring provider defined for this encounter.    Pulse 85   Temp 97.6  F (36.4  C)   Resp 20   Ht 1.803 m (5' 11\")   Wt 81.5 kg (179 lb 11.2 oz)   SpO2 99%   BMI 25.06 kg/m      Do you need any medication refills at today's visit? N    "

## 2019-01-25 NOTE — TELEPHONE ENCOUNTER
Spoke to Andi from [facility]. She advised that patient had new insurance after first of the year. They need new chart notes faxed to [facility] to verify need for oxygen therapy.    Advised that this writer will send Dr. Perlman's office note from 1/24/19 to [phone] today.    Yennifer Coppola RN, BSN

## 2019-02-25 ENCOUNTER — TELEPHONE (OUTPATIENT)
Dept: PULMONOLOGY | Facility: CLINIC | Age: 84
End: 2019-02-25

## 2019-02-25 NOTE — TELEPHONE ENCOUNTER
Received message from Shantal (Des's wife) regarding patient's oxygen. Shantal states that when John dropped off the larger home concentrator they tried to take away the patient's liquid oxygen.     Confirmed that prescription for oxygen included liquid oxygen.     Tried to contact local [facility] office, had to leave message with call center. Will await call back from local office.     Lindsay Marie RN   Pulmonary/Rheumatology Care Coordinator  [facility]

## 2019-02-25 NOTE — TELEPHONE ENCOUNTER
Updated patient's wife that I am still waiting for a call back from [facility].   Lindsay Marie RN   Pulmonary/Rheumatology Care Coordinator  [facility]

## 2019-02-26 NOTE — TELEPHONE ENCOUNTER
Spoke with John regarding patient's oxygen. They state that the patient cannot keep the liquid oxygen as it is unsafe in the current amount of oxygen he needs. Patient is concerned about the tank that was offered to him instead of the liquid oxygen as it limits his mobility.     John is going to call back with more information.     Lindsay Marie RN   Pulmonary/Rheumatology Care Coordinator  [facility]

## 2019-02-26 NOTE — TELEPHONE ENCOUNTER
Huey Lopez is returning a call to Lindsay regarding information on his oxygen.  Please call back when available at the number provided.

## 2019-02-26 NOTE — TELEPHONE ENCOUNTER
Spoke with Shantal, patient's wife regarding [facility] Oxygen. According to Huey at [facility] when the patient uses Liquid oxygen at 8LPM there is a risk that the tank can freeze up. With the compressed oxygen in tanks this is not a risk. Huey also states that there are bags that can be used so the tank can hang from his scooter.     Shantal states that Des does not want tanks. Shantal has multiple questions regarding the oxygen tanks. Advised Shantal to call Huey at [facility] to discuss questions.     Lindsay Marie RN   Pulmonary/Rheumatology Care Coordinator  [facility]

## 2019-06-17 NOTE — TELEPHONE ENCOUNTER
[facility] Call Center    Phone Message    May a detailed message be left on voicemail: yes    Reason for Call: Symptoms or Concerns     If patient has red-flag symptoms, warm transfer to triage line    Current symptom or concern: URI/pt not feeling well    Symptoms have been present for:  A couple day(s)    Has patient previously been seen for this? Yes    By dr perlmann    Are there any new or worsening symptoms? Yes      Action Taken: Message routed to:  Adult Clinics: Pulmonology p [phone]

## 2019-06-17 NOTE — TELEPHONE ENCOUNTER
Patient's wife is concerned patient would not be able to do pulmonary function testing tomorrow. Upon reviewing the patient's appointments patient is not scheduled to see Dr. Perlman until 7/18/19 not 6/18/19. Patient's wife understands. She states patient was seen at [facility] ER last week to be evaluated for an URI. Patient had a chest X-ray which did not show any changes.     Patient's wife states that he is slowly getting better since being seen in the ER. Patient will see his primary care MD on Friday at [facility]. Overnights are still bad for the patient but he is slowly improving.     The patient currently has O2 sats at 95%.     Patient's wife states that she thinks he is fine to wait until July 18 to see Dr. Perlman and will not hesitate to take patient back to the ER if needed.     Will route to Dr. Perlman to see if he would recommend any other treatment.     Alek Marie RN   Pulmonary/CORE Care Coordinator  [facility]

## 2019-06-18 NOTE — TELEPHONE ENCOUNTER
Reviewed patient's symptoms and plan with Dr. Huggins. Per Dr. Huggins okay to proceed with follow up with PCP. Patient should call back with any changes in symptoms. LM for patient and his wife with this information. Encouraged them to call back with questions.     Alek Marie RN   Pulmonary/CORE Care Coordinator  [facility]

## 2019-07-18 NOTE — NURSING NOTE
"Des Saravia's goals for this visit include: Return  He requests these members of his care team be copied on today's visit information: PCP    PCP: Omkar Mccarthy    Referring Provider:  No referring provider defined for this encounter.    /73   Pulse 83   Resp 24   Ht 1.803 m (5' 11\")   Wt 77.5 kg (170 lb 12.8 oz)   SpO2 95%   BMI 23.82 kg/m      Do you need any medication refills at today's visit? N    "

## 2019-07-18 NOTE — PROGRESS NOTES
Reason for Visit  Des Saravia is a 84 year old year old male who is being seen for RECHECK (6 month follow up IPF (idiopathic pulmonary fibrosis). PFT @1230)    ILD HPI    Des Saravia is an 84-year-old male with idiopathic pulmonary fibrosis who is seen today for follow-up.  He is been unable to tolerate anti-fibrotic therapy so he has just been observed and treated symptomatically with supplemental oxygen.  He has fairly advanced disease.  He returns to clinic today stating that overall he feels relatively stable although may be a slight decline in his respiratory status.  His wife feels that he tends to minimize his symptoms and he certainly has episodes of desaturations occasionally as low as in the 70s with exertion.  He was noted to have an O2 sat of 68% when transferring from his scooter to the PFT gonzalez today.  Overall the patient feels he is able to get around and his quality life is good.  They are planning a trip up to the [place] soon.  He uses liquid oxygen for his portable system and he generally uses it on the pulse setting even though he can use it on continuous because he does not want to run out too quickly.  He has a concentrator at home and uses 5 to 6 L at home.      Current Outpatient Medications   Medication     acetaminophen 325 MG TABS     aspirin 81 MG tablet     atorvastatin (LIPITOR) 40 MG tablet     guaiFENesin-codeine (ROBITUSSIN AC) 100-10 MG/5ML SOLN solution     metoprolol (TOPROL-XL) 25 MG 24 hr tablet     Multiple Vitamin TABS     order for DME     PANTOPRAZOLE SODIUM PO     tamsulosin (FLOMAX) 0.4 MG 24 hr capsule     No current facility-administered medications for this visit.      Allergies   Allergen Reactions     Lisinopril Cough     Past Medical History:   Diagnosis Date     CAD (coronary artery disease)     CABG in 1998     GERD (gastroesophageal reflux disease)      HTN (hypertension)      Hyperlipidaemia      IPF (idiopathic pulmonary fibrosis) (H)     diagnosed  in 2008 by VATS lung biopsy; completed [id number] study in 2011 - received drug.  In IPF biomarkers study since 2013.  Pirfenidone started 1/2015, stopped 4/2015 due to rash.       Past Surgical History:   Procedure Laterality Date     CHOLECYSTECTOMY  2/2014     CORONARY ARTERY BYPASS  1998    4 vessel     THORACOSCOPIC BIOPSY LUNG  2009       Social History     Socioeconomic History     Marital status:      Spouse name: Not on file     Number of children: Not on file     Years of education: Not on file     Highest education level: Not on file   Occupational History     Not on file   Social Needs     Financial resource strain: Not on file     Food insecurity:     Worry: Not on file     Inability: Not on file     Transportation needs:     Medical: Not on file     Non-medical: Not on file   Tobacco Use     Smoking status: Never Smoker     Smokeless tobacco: Never Used   Substance and Sexual Activity     Alcohol use: No     Alcohol/week: 0.0 oz     Drug use: Not on file     Sexual activity: Not on file   Lifestyle     Physical activity:     Days per week: Not on file     Minutes per session: Not on file     Stress: Not on file   Relationships     Social connections:     Talks on phone: Not on file     Gets together: Not on file     Attends Sabianist service: Not on file     Active member of club or organization: Not on file     Attends meetings of clubs or organizations: Not on file     Relationship status: Not on file     Intimate partner violence:     Fear of current or ex partner: Not on file     Emotionally abused: Not on file     Physically abused: Not on file     Forced sexual activity: Not on file   Other Topics Concern     Parent/sibling w/ CABG, MI or angioplasty before 65F 55M? Not Asked   Social History Narrative     Not on file       No family history on file.    ROS Pulmonary    A complete ROS was otherwise negative except as noted in the HPI.  Vitals:    07/18/19 1305   BP: 115/73   Pulse: 83   Resp:  "24   SpO2: 95%   Weight: 77.5 kg (170 lb 12.8 oz)   Height: 1.803 m (5' 11\")     Exam:   GENERAL APPEARANCE: Well developed, well nourished, alert, and in no apparent distress.  NECK: supple, no masses, no thyromegaly.  LYMPHATICS: No significant axillary, cervical, or supraclavicular nodes.  RESP: good air flow throughout, - bibasilar crackles  CV: Normal S1, S2, regular rhythm, normal rate, no rub, no murmur,  no gallop, no LE edema.   ABDOMEN:  Bowel sounds normal, soft, nontender, no HSM or masses.   MS: extremities normal- no clubbing, no cyanosis.  SKIN: no rash on limited exam  NEURO: Mentation intact, speech normal, normal strength and tone, normal gait and stance  PSYCH: mentation appears normal. and affect normal/bright  Results: I have reviewed all imaging, PFTs and other relavent tests, please see below for details, PFT and imaging results were reviewed with the patient.  PFTs: Moderate to severe restriction with severe reduction in diffusing capacity.  Potential slight drop in DLCO compared to previous    Assessment and plan:    84-year-old male with idiopathic pulmonary fibrosis with fairly advanced disease.  He is doing relatively well overall given his advanced disease and high oxygen requirements.  Generally feels well and feels his quality of life is good.  We did discuss the potential of using low-dose opioids to treat his dyspnea he does not feel he needs this at this time.  Otherwise I would not make any changes we did discuss the oxygen use and trying to use the continuous flow as much as he can do at least keep his saturations in the 80s.  They will plan to go to the [place] and bring her concentrator and a tank to fill their liquid portable unit.  I will plan to see him back in 6 months with pulmonary function test he can call sooner with any new problems.      CBC   Recent Labs   Lab Test 01/17/16  0011 01/14/16  0526   RBC 4.67 4.67   HGB 14.2 14.3   HCT 42.0 42.4    219 "       Basic Metabolic Panel  Recent Labs   Lab Test 01/16/16  1111 01/15/16  0635 01/14/16  0526   NA  --  140 140   POTASSIUM 4.1 3.7 3.4   CHLORIDE  --  104 104   CO2  --  27 28   BUN  --  18 21   GLC  --  99 102*   TEE  --  8.3* 8.2*       INR  Recent Labs   Lab Test 01/17/16  0011 11/01/15  0743   INR 1.19* 1.14       PFT  PFT Latest Ref Rng & Units 7/18/2019   FVC L 1.72   FEV1 L 1.50   FVC% % 44   FEV1% % 53           CC:

## 2019-07-18 NOTE — LETTER
7/18/2019        RE: Des Saravia  [address]  [place] [facility] [address]        Reason for Visit  Des Saravia is a 84 year old year old male who is being seen for RECHECK (6 month follow up IPF (idiopathic pulmonary fibrosis). PFT @1230)    ILD HPI    Des Saravia is an 84-year-old male with idiopathic pulmonary fibrosis who is seen today for follow-up.  He is been unable to tolerate anti-fibrotic therapy so he has just been observed and treated symptomatically with supplemental oxygen.  He has fairly advanced disease.  He returns to clinic today stating that overall he feels relatively stable although may be a slight decline in his respiratory status.  His wife feels that he tends to minimize his symptoms and he certainly has episodes of desaturations occasionally as low as in the 70s with exertion.  He was noted to have an O2 sat of 68% when transferring from his scooter to the PFT gonzalez today.  Overall the patient feels he is able to get around and his quality life is good.  They are planning a trip up to the [place] soon.  He uses liquid oxygen for his portable system and he generally uses it on the pulse setting even though he can use it on continuous because he does not want to run out too quickly.  He has a concentrator at home and uses 5 to 6 L at home.      Current Outpatient Medications   Medication     acetaminophen 325 MG TABS     aspirin 81 MG tablet     atorvastatin (LIPITOR) 40 MG tablet     guaiFENesin-codeine (ROBITUSSIN AC) 100-10 MG/5ML SOLN solution     metoprolol (TOPROL-XL) 25 MG 24 hr tablet     Multiple Vitamin TABS     order for DME     PANTOPRAZOLE SODIUM PO     tamsulosin (FLOMAX) 0.4 MG 24 hr capsule     No current facility-administered medications for this visit.      Allergies   Allergen Reactions     Lisinopril Cough     Past Medical History:   Diagnosis Date     CAD (coronary artery disease)     CABG in 1998     GERD (gastroesophageal reflux disease)       HTN (hypertension)      Hyperlipidaemia      IPF (idiopathic pulmonary fibrosis) (H)     diagnosed in 2008 by VATS lung biopsy; completed [id number] study in 2011 - received drug.  In IPF biomarkers study since 2013.  Pirfenidone started 1/2015, stopped 4/2015 due to rash.       Past Surgical History:   Procedure Laterality Date     CHOLECYSTECTOMY  2/2014     CORONARY ARTERY BYPASS  1998    4 vessel     THORACOSCOPIC BIOPSY LUNG  2009       Social History     Socioeconomic History     Marital status:      Spouse name: Not on file     Number of children: Not on file     Years of education: Not on file     Highest education level: Not on file   Occupational History     Not on file   Social Needs     Financial resource strain: Not on file     Food insecurity:     Worry: Not on file     Inability: Not on file     Transportation needs:     Medical: Not on file     Non-medical: Not on file   Tobacco Use     Smoking status: Never Smoker     Smokeless tobacco: Never Used   Substance and Sexual Activity     Alcohol use: No     Alcohol/week: 0.0 oz     Drug use: Not on file     Sexual activity: Not on file   Lifestyle     Physical activity:     Days per week: Not on file     Minutes per session: Not on file     Stress: Not on file   Relationships     Social connections:     Talks on phone: Not on file     Gets together: Not on file     Attends Advent service: Not on file     Active member of club or organization: Not on file     Attends meetings of clubs or organizations: Not on file     Relationship status: Not on file     Intimate partner violence:     Fear of current or ex partner: Not on file     Emotionally abused: Not on file     Physically abused: Not on file     Forced sexual activity: Not on file   Other Topics Concern     Parent/sibling w/ CABG, MI or angioplasty before 65F 55M? Not Asked   Social History Narrative     Not on file       No family history on file.    ROS Pulmonary    A complete ROS was  "otherwise negative except as noted in the HPI.  Vitals:    07/18/19 1305   BP: 115/73   Pulse: 83   Resp: 24   SpO2: 95%   Weight: 77.5 kg (170 lb 12.8 oz)   Height: 1.803 m (5' 11\")     Exam:   GENERAL APPEARANCE: Well developed, well nourished, alert, and in no apparent distress.  NECK: supple, no masses, no thyromegaly.  LYMPHATICS: No significant axillary, cervical, or supraclavicular nodes.  RESP: good air flow throughout, - bibasilar crackles  CV: Normal S1, S2, regular rhythm, normal rate, no rub, no murmur,  no gallop, no LE edema.   ABDOMEN:  Bowel sounds normal, soft, nontender, no HSM or masses.   MS: extremities normal- no clubbing, no cyanosis.  SKIN: no rash on limited exam  NEURO: Mentation intact, speech normal, normal strength and tone, normal gait and stance  PSYCH: mentation appears normal. and affect normal/bright  Results: I have reviewed all imaging, PFTs and other relavent tests, please see below for details, PFT and imaging results were reviewed with the patient.  PFTs: Moderate to severe restriction with severe reduction in diffusing capacity.  Potential slight drop in DLCO compared to previous    Assessment and plan:    84-year-old male with idiopathic pulmonary fibrosis with fairly advanced disease.  He is doing relatively well overall given his advanced disease and high oxygen requirements.  Generally feels well and feels his quality of life is good.  We did discuss the potential of using low-dose opioids to treat his dyspnea he does not feel he needs this at this time.  Otherwise I would not make any changes we did discuss the oxygen use and trying to use the continuous flow as much as he can do at least keep his saturations in the 80s.  They will plan to go to the [place] and bring her concentrator and a tank to fill their liquid portable unit.  I will plan to see him back in 6 months with pulmonary function test he can call sooner with any new problems.      CBC   Recent Labs   Lab " Test 01/17/16  0011 01/14/16  0526   RBC 4.67 4.67   HGB 14.2 14.3   HCT 42.0 42.4    219       Basic Metabolic Panel  Recent Labs   Lab Test 01/16/16  1111 01/15/16  0635 01/14/16  0526   NA  --  140 140   POTASSIUM 4.1 3.7 3.4   CHLORIDE  --  104 104   CO2  --  27 28   BUN  --  18 21   GLC  --  99 102*   TEE  --  8.3* 8.2*       INR  Recent Labs   Lab Test 01/17/16  0011 11/01/15  0743   INR 1.19* 1.14       PFT  PFT Latest Ref Rng & Units 7/18/2019   FVC L 1.72   FEV1 L 1.50   FVC% % 44   FEV1% % 53           CC:            Sincerely,        David Morris Perlman, MD

## 2020-01-01 ENCOUNTER — HEALTH MAINTENANCE LETTER (OUTPATIENT)
Age: 85
End: 2020-01-01

## 2020-05-04 ENCOUNTER — TELEPHONE (OUTPATIENT)
Dept: PULMONOLOGY | Facility: CLINIC | Age: 85
End: 2020-05-04

## 2020-05-04 NOTE — TELEPHONE ENCOUNTER
[facility] Call Center    Phone Message    May a detailed message be left on voicemail: yes     Reason for Call: Other: wife calling to say her  has passed away, Des passed away April 16, 2020.     Action Taken: Message routed to:  Adult Clinics: Pulmonology p [phone]    Travel Screening: Not Applicable

## 2020-05-04 NOTE — TELEPHONE ENCOUNTER
I spoke with patient's wife she would like to make sure both Dr. Perlman and Dr. Abarca are aware of Des's death.     Notification of a  patient form filled out and sent to HIM.     Alek Marie RN   [facility] Care Coordinator  [facility]